# Patient Record
Sex: FEMALE | Race: ASIAN | NOT HISPANIC OR LATINO | Employment: FULL TIME | URBAN - METROPOLITAN AREA
[De-identification: names, ages, dates, MRNs, and addresses within clinical notes are randomized per-mention and may not be internally consistent; named-entity substitution may affect disease eponyms.]

---

## 2023-03-31 ENCOUNTER — TELEPHONE (OUTPATIENT)
Dept: PRIMARY CARE | Facility: CLINIC | Age: 48
End: 2023-03-31
Payer: COMMERCIAL

## 2023-03-31 NOTE — TELEPHONE ENCOUNTER
Patient has had a sore throat for a couple of days.  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

## 2023-04-03 ENCOUNTER — TELEPHONE (OUTPATIENT)
Dept: PRIMARY CARE | Facility: CLINIC | Age: 48
End: 2023-04-03
Payer: COMMERCIAL

## 2023-04-03 NOTE — TELEPHONE ENCOUNTER
Pt tested positive for Covid on 3/31 at [facility] in [place]. Pt states that she also took strep test at that time and that the line was faint. She was told that she could be  at the early stages of strep, but was given a negative result. Pt needs a negative strep test in order to return to work. She is asking to come to our office for outdoor testing. She is also requesting antibiotics be sent to [facility] in [place]. Please advise.     Pt cell# [phone]

## 2023-04-05 ENCOUNTER — TELEMEDICINE (OUTPATIENT)
Dept: PRIMARY CARE | Facility: CLINIC | Age: 48
End: 2023-04-05
Payer: COMMERCIAL

## 2023-04-05 DIAGNOSIS — J02.9 SORE THROAT: ICD-10-CM

## 2023-04-05 DIAGNOSIS — U07.1 COVID-19: Primary | ICD-10-CM

## 2023-04-05 PROCEDURE — 99213 OFFICE O/P EST LOW 20 MIN: CPT | Performed by: FAMILY MEDICINE

## 2023-04-05 PROCEDURE — 87880 STREP A ASSAY W/OPTIC: CPT | Performed by: FAMILY MEDICINE

## 2023-04-05 ASSESSMENT — ENCOUNTER SYMPTOMS: SORE THROAT: 1

## 2023-04-05 NOTE — PROGRESS NOTES
"Subjective   Patient ID: Indu Sinclair is a 47 y.o. female who presents for Sore Throat (Tested positive for COVID 3/31. Sore throat and cough linger. She was seen at a [facility] urgent care - tested Friday but the test line was faint. Not given an antibiotic. ).    She was traveling to [place] last week and she came home wednesday and started to not feel good.  She went to [facility] Friday and was diagnosed as negative strep and positive covid.  She was told she may have the \"beginning\" of strep.   She took a negative home covid test yesterday.  She wants to be tested for strep again because her throat still hurts.      Sore Throat   This is a new problem. The current episode started in the past 7 days.        Review of Systems   HENT:  Positive for sore throat.        Objective   LMP 03/16/2023     Physical Exam    Assessment/Plan   Diagnoses and all orders for this visit:  COVID-19  Sore throat  -     POCT Rapid Strep A manually resulted         "

## 2023-04-05 NOTE — PATIENT INSTRUCTIONS
Today we have addressed your covid and sore throat.   I have recommended coming to the parking lot for rapid strep testing.  You are able to come tomorrow so we will coordinate this for you.

## 2023-04-06 ENCOUNTER — TELEPHONE (OUTPATIENT)
Dept: PRIMARY CARE | Facility: CLINIC | Age: 48
End: 2023-04-06

## 2023-04-06 DIAGNOSIS — J02.9 SORE THROAT: Primary | ICD-10-CM

## 2023-04-06 LAB — POC RAPID STREP: NEGATIVE

## 2023-04-06 PROCEDURE — 87651 STREP A DNA AMP PROBE: CPT

## 2023-04-07 ENCOUNTER — TELEPHONE (OUTPATIENT)
Dept: PRIMARY CARE | Facility: CLINIC | Age: 48
End: 2023-04-07
Payer: COMMERCIAL

## 2023-04-07 LAB — GROUP A STREP, PCR: NOT DETECTED

## 2023-04-07 NOTE — TELEPHONE ENCOUNTER
----- Message from Inna Goins DO sent at 4/7/2023 10:02 AM EDT -----  Strep test negative. No sign of strep.

## 2023-04-12 ENCOUNTER — TELEPHONE (OUTPATIENT)
Dept: PRIMARY CARE | Facility: CLINIC | Age: 48
End: 2023-04-12
Payer: COMMERCIAL

## 2023-04-12 NOTE — TELEPHONE ENCOUNTER
Pt thinks she may have bronchitis. Pt has productive cough and no other symptoms. Cough began about 2 weeks ago. Pt says she was tested for covid and strep with our office and bother were negative. Pt is aware that Dr. Goins is out of the office. I advised urgent care but pt would like to know if it is possible for another provider to see her. Pt told that if she does end up going to an urgent care, to let us know so that we can disregard this message. Please advise.   [phone]

## 2023-08-29 PROBLEM — E55.9 HYPOVITAMINOSIS D: Status: ACTIVE | Noted: 2023-08-29

## 2023-08-29 PROBLEM — H66.90 ACUTE RECURRENT OTITIS MEDIA: Status: ACTIVE | Noted: 2023-08-29

## 2023-08-29 PROBLEM — M54.12 CERVICAL NEURITIS: Status: ACTIVE | Noted: 2023-08-29

## 2023-08-29 PROBLEM — R94.128 ABNORMAL TYMPANOGRAM: Status: ACTIVE | Noted: 2023-08-29

## 2023-08-29 PROBLEM — R42 VERTIGO: Status: ACTIVE | Noted: 2023-08-29

## 2023-08-29 PROBLEM — G89.29 NECK PAIN, CHRONIC: Status: ACTIVE | Noted: 2023-08-29

## 2023-08-29 PROBLEM — H74.02 TYMPANOSCLEROSIS OF LEFT EAR: Status: ACTIVE | Noted: 2023-08-29

## 2023-08-29 PROBLEM — M54.2 NECK PAIN, CHRONIC: Status: ACTIVE | Noted: 2023-08-29

## 2023-08-29 PROBLEM — G43.711 CHRONIC MIGRAINE WITHOUT AURA, WITH INTRACTABLE MIGRAINE, SO STATED, WITH STATUS MIGRAINOSUS: Status: ACTIVE | Noted: 2023-08-29

## 2023-08-29 PROBLEM — K21.9 CHRONIC GERD: Status: ACTIVE | Noted: 2023-08-29

## 2023-08-29 PROBLEM — H90.12 CONDUCTIVE HEARING LOSS OF LEFT EAR WITH UNRESTRICTED HEARING OF RIGHT EAR: Status: ACTIVE | Noted: 2023-08-29

## 2023-08-29 PROBLEM — G44.86 CERVICOGENIC HEADACHE: Status: ACTIVE | Noted: 2023-08-29

## 2023-08-29 PROBLEM — Z78.9 USES BIRTH CONTROL: Status: ACTIVE | Noted: 2023-08-29

## 2023-08-29 RX ORDER — PHENOL 1.4 %
AEROSOL, SPRAY (ML) MUCOUS MEMBRANE
COMMUNITY
Start: 2022-10-05

## 2023-08-29 RX ORDER — ACETAMINOPHEN AND DIPHENHYDRAMINE HYDROCHLORIDE 500; 25 MG/1; MG/1
TABLET, FILM COATED ORAL
COMMUNITY
Start: 2022-10-05

## 2023-08-29 RX ORDER — UBROGEPANT 50 MG/1
1 TABLET ORAL AS NEEDED
COMMUNITY
End: 2023-10-05 | Stop reason: ALTCHOICE

## 2023-08-29 RX ORDER — TOPIRAMATE 100 MG/1
100 TABLET, FILM COATED ORAL NIGHTLY
COMMUNITY
End: 2023-10-05 | Stop reason: SDUPTHER

## 2023-08-29 RX ORDER — NORGESTIMATE AND ETHINYL ESTRADIOL 7DAYSX3 28
KIT ORAL
COMMUNITY
End: 2023-10-05 | Stop reason: ALTCHOICE

## 2023-08-29 RX ORDER — FENUGREEK SEED/BL.THISTLE/ANIS 340 MG
CAPSULE ORAL
COMMUNITY
Start: 2022-10-05

## 2023-08-29 RX ORDER — MELOXICAM 15 MG/1
1 TABLET ORAL DAILY PRN
COMMUNITY
Start: 2023-08-04 | End: 2023-10-05 | Stop reason: ALTCHOICE

## 2023-08-29 RX ORDER — PANTOPRAZOLE SODIUM 40 MG/1
40 TABLET, DELAYED RELEASE ORAL DAILY
COMMUNITY
End: 2024-03-22 | Stop reason: WASHOUT

## 2023-08-29 RX ORDER — VITAMIN E (DL,TOCOPHERYL ACET) 180 MG
CAPSULE ORAL
COMMUNITY
Start: 2022-10-05 | End: 2023-10-05 | Stop reason: SDUPTHER

## 2023-08-29 RX ORDER — NORETHINDRONE 0.35 MG/1
1 TABLET ORAL DAILY
COMMUNITY
Start: 2023-02-16 | End: 2024-01-31 | Stop reason: SDUPTHER

## 2023-08-29 RX ORDER — MECLIZINE HCL 12.5 MG 12.5 MG/1
1 TABLET ORAL 3 TIMES DAILY PRN
COMMUNITY
Start: 2022-12-30 | End: 2023-10-05 | Stop reason: ALTCHOICE

## 2023-10-05 ENCOUNTER — TELEMEDICINE (OUTPATIENT)
Dept: NEUROLOGY | Facility: CLINIC | Age: 48
End: 2023-10-05
Payer: COMMERCIAL

## 2023-10-05 DIAGNOSIS — G43.711 CHRONIC MIGRAINE WITHOUT AURA, INTRACTABLE, WITH STATUS MIGRAINOSUS: Primary | ICD-10-CM

## 2023-10-05 PROCEDURE — 99213 OFFICE O/P EST LOW 20 MIN: CPT | Performed by: PSYCHIATRY & NEUROLOGY

## 2023-10-05 RX ORDER — TOPIRAMATE 100 MG/1
100 TABLET, FILM COATED ORAL NIGHTLY
Qty: 30 TABLET | Refills: 2 | Status: SHIPPED | OUTPATIENT
Start: 2023-10-05 | End: 2024-03-27

## 2023-10-05 RX ORDER — TOPIRAMATE 25 MG/1
25 TABLET ORAL DAILY PRN
COMMUNITY

## 2023-10-05 ASSESSMENT — PATIENT HEALTH QUESTIONNAIRE - PHQ9
2. FEELING DOWN, DEPRESSED OR HOPELESS: NOT AT ALL
SUM OF ALL RESPONSES TO PHQ9 QUESTIONS 1 AND 2: 0
1. LITTLE INTEREST OR PLEASURE IN DOING THINGS: NOT AT ALL

## 2023-10-05 NOTE — PROGRESS NOTES
"Experiencing 3-4 migraines per month. Feels them coming on early-uses ice, Excedrin migraine or tylenol and PRN 25mg Topiramate. Relieves in 2-24 hours.   Stress increases migraines  Tried Ubrelvy and did not feel it was effective.   Topiramate 100mg daily for prevention.     Has other mild headaches 4 per week. Does not take anythgin for it and is not distracting or debilitating. \" I am used to it\"  Used to be daily and much better managed now.   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     Migraine occurs back of neck and radiates to forehead.   Associated light and noise sensitivity and nausea  Triggers are lack of sleep, stress, dehydration.     Recent death in the family. Just flew home today and does have a migraine today from sleeping at the airport with flight cancellation.     Poor sleep at night. Averages 4-5.Naps during the day.   Endorses trouble falling asleep and staying asleep, variable.   Hard to turn mind off.     Mood is good. Feels is good at balancing her life and is a very positive person.   "

## 2023-11-01 ENCOUNTER — TELEPHONE (OUTPATIENT)
Dept: PRIMARY CARE | Facility: CLINIC | Age: 48
End: 2023-11-01
Payer: COMMERCIAL

## 2023-11-01 NOTE — TELEPHONE ENCOUNTER
Per pt, she is asking for a referral to dermatology; she has dark spots (not raised) on both side of face, getting bigger.

## 2023-11-13 ENCOUNTER — APPOINTMENT (OUTPATIENT)
Dept: ORTHOPEDIC SURGERY | Facility: CLINIC | Age: 48
End: 2023-11-13
Payer: COMMERCIAL

## 2023-11-17 ENCOUNTER — OFFICE VISIT (OUTPATIENT)
Dept: PRIMARY CARE | Facility: CLINIC | Age: 48
End: 2023-11-17
Payer: COMMERCIAL

## 2023-11-17 VITALS
HEART RATE: 68 BPM | SYSTOLIC BLOOD PRESSURE: 132 MMHG | HEIGHT: 58 IN | DIASTOLIC BLOOD PRESSURE: 84 MMHG | RESPIRATION RATE: 16 BRPM | TEMPERATURE: 98.2 F | BODY MASS INDEX: 23.72 KG/M2 | WEIGHT: 113 LBS

## 2023-11-17 DIAGNOSIS — L81.4 SKIN SPOTS-AGING: Primary | ICD-10-CM

## 2023-11-17 PROCEDURE — 99213 OFFICE O/P EST LOW 20 MIN: CPT | Performed by: FAMILY MEDICINE

## 2023-11-17 NOTE — PROGRESS NOTES
"Subjective   Patient ID: Indu Sinclair is a 48 y.o. female who presents for Skin Check (Dark spots on face; Requesting derm referral ).    Last couple of years she has some darker spots on her face and has used some dark spot corrector cream that hasn't worked. No itching or bleeding.  They are only on the face.  She normally wear sunscreen.  No other new moles.      She defers flu shot today         Review of Systems    Objective   /84   Pulse 68   Temp 36.8 °C (98.2 °F)   Resp 16   Ht 1.473 m (4' 10\")   Wt 51.3 kg (113 lb)   BMI 23.62 kg/m²     Physical Exam  Constitutional:       Appearance: Normal appearance.   Musculoskeletal:      Cervical back: Normal range of motion.   Skin:     Comments: Darkened spots on the cheeks bilaterally   Neurological:      General: No focal deficit present.      Mental Status: She is alert and oriented to person, place, and time.   Psychiatric:         Mood and Affect: Mood normal.         Behavior: Behavior normal.         Assessment/Plan   Diagnoses and all orders for this visit:  Skin spots-aging  -     Referral to Dermatology         "

## 2023-11-20 ENCOUNTER — OFFICE VISIT (OUTPATIENT)
Dept: ORTHOPEDIC SURGERY | Facility: CLINIC | Age: 48
End: 2023-11-20
Payer: COMMERCIAL

## 2023-11-20 ENCOUNTER — ANCILLARY PROCEDURE (OUTPATIENT)
Dept: RADIOLOGY | Facility: CLINIC | Age: 48
End: 2023-11-20
Payer: COMMERCIAL

## 2023-11-20 DIAGNOSIS — M77.11 LATERAL EPICONDYLITIS OF BOTH ELBOWS: ICD-10-CM

## 2023-11-20 DIAGNOSIS — M25.522 PAIN OF BOTH ELBOWS: ICD-10-CM

## 2023-11-20 DIAGNOSIS — M25.521 PAIN OF BOTH ELBOWS: ICD-10-CM

## 2023-11-20 DIAGNOSIS — M79.641 PAIN OF RIGHT HAND: ICD-10-CM

## 2023-11-20 DIAGNOSIS — M77.12 LATERAL EPICONDYLITIS OF BOTH ELBOWS: ICD-10-CM

## 2023-11-20 PROCEDURE — 73070 X-RAY EXAM OF ELBOW: CPT | Mod: BILATERAL PROCEDURE | Performed by: ORTHOPAEDIC SURGERY

## 2023-11-20 PROCEDURE — 99214 OFFICE O/P EST MOD 30 MIN: CPT | Mod: 25 | Performed by: ORTHOPAEDIC SURGERY

## 2023-11-20 PROCEDURE — 73070 X-RAY EXAM OF ELBOW: CPT | Mod: 50,FY

## 2023-11-20 PROCEDURE — 1036F TOBACCO NON-USER: CPT | Performed by: ORTHOPAEDIC SURGERY

## 2023-11-20 PROCEDURE — 99214 OFFICE O/P EST MOD 30 MIN: CPT | Performed by: ORTHOPAEDIC SURGERY

## 2023-11-20 NOTE — PROGRESS NOTES
History: Indu is here for her elbows.  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  She has pain in both elbows.  She notes pain at night when trying to sleep.  She has been taking the meloxicam more consistently.  She does get relief from her knee brace but it irritates her skin.    Past medical history: Multiple  Medications: Multiple  Allergies: No known drug allergies    Please refer to the intake H&P regarding the patient's review of systems, family history and social history as was done today    HEENT: Normal  Lungs: Clear to auscultation  Heart: RRR  Abdomen: Soft, nontender  Skin: clear  Extremity: Elbow exam shows tenderness over the lateral epicondyle on both sides.  There is pain with finger extension mainly but not so much wrist extension.  She has full elbow motion bilaterally including flexion extension supination and pronation.  Good  strength throughout.  No redness or skin breakdown.  Lower extremity exam is normal for strength, motion, stability and neurovascular assessment.    Radiographs: Elbow x-rays are essentially negative.    Assessment: Bilateral lateral epicondylitis    Plan: We discussed several options and she is willing to get into some formal therapy.  We will also show her BandIT braces for support.  She is already taking meloxicam.  We will get her a sleeve for the knee brace.  She will follow-up in 6 to 8 weeks for recheck of the elbows and if not improved can consider further imaging or other modalities.  All questions were answered today with the patient.    This note was generated with voice recognition software and may contain grammatical errors.

## 2024-01-08 ENCOUNTER — APPOINTMENT (OUTPATIENT)
Dept: ORTHOPEDIC SURGERY | Facility: CLINIC | Age: 49
End: 2024-01-08
Payer: COMMERCIAL

## 2024-01-29 ENCOUNTER — TELEPHONE (OUTPATIENT)
Dept: OBSTETRICS AND GYNECOLOGY | Facility: CLINIC | Age: 49
End: 2024-01-29

## 2024-01-31 DIAGNOSIS — Z78.9 USES BIRTH CONTROL: Primary | ICD-10-CM

## 2024-01-31 RX ORDER — NORETHINDRONE 0.35 MG/1
1 TABLET ORAL DAILY
Qty: 28 TABLET | Refills: 0 | Status: SHIPPED | OUTPATIENT
Start: 2024-01-31 | End: 2024-02-21

## 2024-02-09 ENCOUNTER — APPOINTMENT (OUTPATIENT)
Dept: PRIMARY CARE | Facility: CLINIC | Age: 49
End: 2024-02-09
Payer: COMMERCIAL

## 2024-02-19 ENCOUNTER — APPOINTMENT (OUTPATIENT)
Dept: ORTHOPEDIC SURGERY | Facility: CLINIC | Age: 49
End: 2024-02-19
Payer: COMMERCIAL

## 2024-02-20 ENCOUNTER — TELEPHONE (OUTPATIENT)
Dept: OBSTETRICS AND GYNECOLOGY | Facility: CLINIC | Age: 49
End: 2024-02-20
Payer: COMMERCIAL

## 2024-02-20 DIAGNOSIS — Z78.9 USES BIRTH CONTROL: ICD-10-CM

## 2024-02-21 RX ORDER — NORETHINDRONE 0.35 MG/1
1 TABLET ORAL DAILY
Qty: 84 TABLET | Refills: 0 | Status: SHIPPED | OUTPATIENT
Start: 2024-02-21 | End: 2024-04-30 | Stop reason: SDUPTHER

## 2024-02-22 ENCOUNTER — APPOINTMENT (OUTPATIENT)
Dept: OBSTETRICS AND GYNECOLOGY | Facility: CLINIC | Age: 49
End: 2024-02-22
Payer: COMMERCIAL

## 2024-03-15 ENCOUNTER — APPOINTMENT (OUTPATIENT)
Dept: PRIMARY CARE | Facility: CLINIC | Age: 49
End: 2024-03-15
Payer: COMMERCIAL

## 2024-03-22 ENCOUNTER — OFFICE VISIT (OUTPATIENT)
Dept: PRIMARY CARE | Facility: CLINIC | Age: 49
End: 2024-03-22
Payer: COMMERCIAL

## 2024-03-22 ENCOUNTER — LAB (OUTPATIENT)
Dept: LAB | Facility: LAB | Age: 49
End: 2024-03-22
Payer: COMMERCIAL

## 2024-03-22 ENCOUNTER — TELEPHONE (OUTPATIENT)
Dept: PRIMARY CARE | Facility: CLINIC | Age: 49
End: 2024-03-22

## 2024-03-22 VITALS
DIASTOLIC BLOOD PRESSURE: 88 MMHG | HEART RATE: 74 BPM | SYSTOLIC BLOOD PRESSURE: 124 MMHG | RESPIRATION RATE: 16 BRPM | WEIGHT: 112 LBS | OXYGEN SATURATION: 98 % | TEMPERATURE: 98.1 F | BODY MASS INDEX: 23.51 KG/M2 | HEIGHT: 58 IN

## 2024-03-22 DIAGNOSIS — G43.711 CHRONIC MIGRAINE WITHOUT AURA, INTRACTABLE, WITH STATUS MIGRAINOSUS: ICD-10-CM

## 2024-03-22 DIAGNOSIS — E55.9 HYPOVITAMINOSIS D: Primary | ICD-10-CM

## 2024-03-22 DIAGNOSIS — Z00.00 HEALTHCARE MAINTENANCE: ICD-10-CM

## 2024-03-22 DIAGNOSIS — Z00.00 HEALTHCARE MAINTENANCE: Primary | ICD-10-CM

## 2024-03-22 DIAGNOSIS — Z12.11 SCREENING FOR MALIGNANT NEOPLASM OF COLON: ICD-10-CM

## 2024-03-22 PROBLEM — M54.2 NECK PAIN, CHRONIC: Status: RESOLVED | Noted: 2023-08-29 | Resolved: 2024-03-22

## 2024-03-22 PROBLEM — G89.29 NECK PAIN, CHRONIC: Status: RESOLVED | Noted: 2023-08-29 | Resolved: 2024-03-22

## 2024-03-22 LAB
25(OH)D3 SERPL-MCNC: 12 NG/ML (ref 30–100)
ALBUMIN SERPL BCP-MCNC: 4.1 G/DL (ref 3.4–5)
ALP SERPL-CCNC: 50 U/L (ref 33–110)
ALT SERPL W P-5'-P-CCNC: 19 U/L (ref 7–45)
ANION GAP SERPL CALC-SCNC: 10 MMOL/L (ref 10–20)
AST SERPL W P-5'-P-CCNC: 17 U/L (ref 9–39)
BASOPHILS # BLD AUTO: 0.12 X10*3/UL (ref 0–0.1)
BASOPHILS NFR BLD AUTO: 1.3 %
BILIRUB SERPL-MCNC: 0.4 MG/DL (ref 0–1.2)
BUN SERPL-MCNC: 16 MG/DL (ref 6–23)
CALCIUM SERPL-MCNC: 8.6 MG/DL (ref 8.6–10.3)
CHLORIDE SERPL-SCNC: 111 MMOL/L (ref 98–107)
CHOLEST SERPL-MCNC: 162 MG/DL (ref 0–199)
CHOLESTEROL/HDL RATIO: 2.7
CO2 SERPL-SCNC: 21 MMOL/L (ref 21–32)
CREAT SERPL-MCNC: 0.73 MG/DL (ref 0.5–1.05)
EGFRCR SERPLBLD CKD-EPI 2021: >90 ML/MIN/1.73M*2
EOSINOPHIL # BLD AUTO: 1.66 X10*3/UL (ref 0–0.7)
EOSINOPHIL NFR BLD AUTO: 17.6 %
ERYTHROCYTE [DISTWIDTH] IN BLOOD BY AUTOMATED COUNT: 15 % (ref 11.5–14.5)
GLUCOSE SERPL-MCNC: 92 MG/DL (ref 74–99)
HCT VFR BLD AUTO: 39.5 % (ref 36–46)
HDLC SERPL-MCNC: 59.6 MG/DL
HGB BLD-MCNC: 14.3 G/DL (ref 12–16)
IMM GRANULOCYTES # BLD AUTO: 0.01 X10*3/UL (ref 0–0.7)
IMM GRANULOCYTES NFR BLD AUTO: 0.1 % (ref 0–0.9)
LDLC SERPL CALC-MCNC: 69 MG/DL
LYMPHOCYTES # BLD AUTO: 2.85 X10*3/UL (ref 1.2–4.8)
LYMPHOCYTES NFR BLD AUTO: 30.2 %
MCH RBC QN AUTO: 32.9 PG (ref 26–34)
MCHC RBC AUTO-ENTMCNC: 36.2 G/DL (ref 32–36)
MCV RBC AUTO: 91 FL (ref 80–100)
MONOCYTES # BLD AUTO: 0.56 X10*3/UL (ref 0.1–1)
MONOCYTES NFR BLD AUTO: 5.9 %
NEUTROPHILS # BLD AUTO: 4.24 X10*3/UL (ref 1.2–7.7)
NEUTROPHILS NFR BLD AUTO: 44.9 %
NON HDL CHOLESTEROL: 102 MG/DL (ref 0–149)
NRBC BLD-RTO: 0 /100 WBCS (ref 0–0)
PLATELET # BLD AUTO: 326 X10*3/UL (ref 150–450)
POTASSIUM SERPL-SCNC: 4.1 MMOL/L (ref 3.5–5.3)
PROT SERPL-MCNC: 6.4 G/DL (ref 6.4–8.2)
RBC # BLD AUTO: 4.35 X10*6/UL (ref 4–5.2)
SODIUM SERPL-SCNC: 138 MMOL/L (ref 136–145)
TRIGL SERPL-MCNC: 167 MG/DL (ref 0–149)
TSH SERPL-ACNC: 1.25 MIU/L (ref 0.44–3.98)
VLDL: 33 MG/DL (ref 0–40)
WBC # BLD AUTO: 9.4 X10*3/UL (ref 4.4–11.3)

## 2024-03-22 PROCEDURE — 84443 ASSAY THYROID STIM HORMONE: CPT

## 2024-03-22 PROCEDURE — 85025 COMPLETE CBC W/AUTO DIFF WBC: CPT

## 2024-03-22 PROCEDURE — 82306 VITAMIN D 25 HYDROXY: CPT

## 2024-03-22 PROCEDURE — 1036F TOBACCO NON-USER: CPT | Performed by: FAMILY MEDICINE

## 2024-03-22 PROCEDURE — 90715 TDAP VACCINE 7 YRS/> IM: CPT | Performed by: FAMILY MEDICINE

## 2024-03-22 PROCEDURE — 36415 COLL VENOUS BLD VENIPUNCTURE: CPT

## 2024-03-22 PROCEDURE — 80053 COMPREHEN METABOLIC PANEL: CPT

## 2024-03-22 PROCEDURE — 99396 PREV VISIT EST AGE 40-64: CPT | Performed by: FAMILY MEDICINE

## 2024-03-22 PROCEDURE — 80061 LIPID PANEL: CPT

## 2024-03-22 PROCEDURE — 90471 IMMUNIZATION ADMIN: CPT | Performed by: FAMILY MEDICINE

## 2024-03-22 RX ORDER — ERGOCALCIFEROL 1.25 MG/1
50000 CAPSULE ORAL
Qty: 12 CAPSULE | Refills: 0 | Status: SHIPPED | OUTPATIENT
Start: 2024-03-22 | End: 2024-06-14

## 2024-03-22 NOTE — RESULT ENCOUNTER NOTE
Low vitamin d if not on supplements we will start start if she is will call in prescription strength  Triglycerides are high please discuss with PCP

## 2024-03-22 NOTE — PROGRESS NOTES
"Subjective   Patient ID: Indu Sinclair is a 48 y.o. female who presents for Annual Exam.    Dentist and Eye Doctor appointments: utd on eye doctor but due for dentist  Immunizations: due for Tdap  Diet: Clean eat and meal prep - 30 days no alcohol and no sugar and is an [place]   Exercise: Work out 5 days   Supplements: mvi, etc  Menstrual Cycles: regular  Sexually Active:Yes, male, no std concern  Pregnancy History:   Cancer Screening:    Cervical: seeing GYN next month   Breast: GYN   Colon:  - Dr. Dukes, wnl due in 10 years   Skin: next month   DEXA:N/A       HPI     Review of Systems    Objective   /88   Pulse 74   Temp 36.7 °C (98.1 °F)   Resp 16   Ht 1.473 m (4' 10\")   Wt 50.8 kg (112 lb)   LMP 2024 (Approximate) Comment: last pap 2019, has appt scheduled with obgyn next month  SpO2 98%   BMI 23.41 kg/m²     Physical Exam  Constitutional:       General: She is not in acute distress.     Appearance: She is well-developed. She is not diaphoretic.   HENT:      Head: Normocephalic and atraumatic.      Right Ear: Tympanic membrane normal.      Left Ear: Tympanic membrane normal.      Nose: Nose normal.      Mouth/Throat:      Mouth: Mucous membranes are moist.   Eyes:      General: No scleral icterus.     Pupils: Pupils are equal, round, and reactive to light.   Neck:      Thyroid: No thyromegaly.      Vascular: No JVD.   Cardiovascular:      Rate and Rhythm: Normal rate and regular rhythm.      Heart sounds: Normal heart sounds. No murmur heard.     No friction rub. No gallop.   Pulmonary:      Effort: Pulmonary effort is normal. No respiratory distress.      Breath sounds: Normal breath sounds. No wheezing or rales.   Chest:      Chest wall: No tenderness.   Abdominal:      General: Bowel sounds are normal. There is no distension.      Palpations: Abdomen is soft. There is no mass.      Tenderness: There is no abdominal tenderness. There is no rebound.   Musculoskeletal:         " General: Normal range of motion.      Cervical back: Normal range of motion and neck supple.   Lymphadenopathy:      Cervical: No cervical adenopathy.   Skin:     General: Skin is warm and dry.   Neurological:      General: No focal deficit present.      Mental Status: She is alert and oriented to person, place, and time.      Deep Tendon Reflexes: Reflexes normal.   Psychiatric:         Mood and Affect: Mood normal.         Behavior: Behavior normal.         Assessment/Plan   Diagnoses and all orders for this visit:  Healthcare maintenance  -     CT cardiac scoring wo IV contrast; Future  -     CBC; Future  -     Comprehensive Metabolic Panel; Future  -     Lipid Panel; Future  -     Vitamin D 25 hydroxy; Future  -     TSH with reflex to Free T4 if abnormal; Future  Screening for malignant neoplasm of colon  Comments:  2021 - - wnl - due in 10  Other orders  -     Tdap vaccine, age 7 years and older  (BOOSTRIX)

## 2024-03-22 NOTE — PATIENT INSTRUCTIONS
Today we performed your Annual Physical Exam.  Your preventative health care, labs and vaccinations have been reviewed and are up to date.     Your vaccines are up to date.     Follow up in 1 year for your Complete Physical Exam

## 2024-03-25 ENCOUNTER — APPOINTMENT (OUTPATIENT)
Dept: ORTHOPEDIC SURGERY | Facility: CLINIC | Age: 49
End: 2024-03-25
Payer: COMMERCIAL

## 2024-03-26 DIAGNOSIS — G43.711 CHRONIC MIGRAINE WITHOUT AURA, INTRACTABLE, WITH STATUS MIGRAINOSUS: ICD-10-CM

## 2024-03-26 NOTE — TELEPHONE ENCOUNTER
Left message for Indu to share results and noted they are also resulted in My chart. If she has questions, return number left.

## 2024-03-26 NOTE — TELEPHONE ENCOUNTER
----- Message from Nu Adams MD sent at 3/22/2024  4:14 PM EDT -----  Low vitamin d if not on supplements we will start start if she is will call in prescription strength  Triglycerides are high please discuss with PCP

## 2024-03-27 RX ORDER — TOPIRAMATE 100 MG/1
100 TABLET, FILM COATED ORAL NIGHTLY
Qty: 90 TABLET | Refills: 0 | Status: SHIPPED | OUTPATIENT
Start: 2024-03-27

## 2024-04-12 ENCOUNTER — OFFICE VISIT (OUTPATIENT)
Dept: DERMATOLOGY | Facility: CLINIC | Age: 49
End: 2024-04-12
Payer: COMMERCIAL

## 2024-04-12 DIAGNOSIS — L82.1 SEBORRHEIC KERATOSIS: Primary | ICD-10-CM

## 2024-04-12 DIAGNOSIS — L81.1 MELASMA: ICD-10-CM

## 2024-04-12 PROCEDURE — 1036F TOBACCO NON-USER: CPT | Performed by: DERMATOLOGY

## 2024-04-12 PROCEDURE — 99204 OFFICE O/P NEW MOD 45 MIN: CPT | Performed by: DERMATOLOGY

## 2024-04-12 RX ORDER — TRETINOIN 0.5 MG/G
CREAM TOPICAL
Qty: 305 G | Refills: 3 | Status: SHIPPED | OUTPATIENT
Start: 2024-04-12

## 2024-04-12 ASSESSMENT — DERMATOLOGY PATIENT ASSESSMENT
DO YOU HAVE ANY NEW OR CHANGING LESIONS: YES
ARE YOU AN ORGAN TRANSPLANT RECIPIENT: NO
HAVE YOU HAD OR DO YOU HAVE A STAPH INFECTION: NO
HAVE YOU HAD OR DO YOU HAVE VASCULAR DISEASE: NO
ARE YOU TRYING TO GET PREGNANT: NO
ARE YOU ON BIRTH CONTROL: YES
WHERE ARE THESE NEW OR CHANGING LESIONS LOCATED: BACK
DO YOU USE A TANNING BED: NO
DO YOU USE SUNSCREEN: DAILY

## 2024-04-12 ASSESSMENT — DERMATOLOGY QUALITY OF LIFE (QOL) ASSESSMENT
RATE HOW BOTHERED YOU ARE BY SYMPTOMS OF YOUR SKIN PROBLEM (EG, ITCHING, STINGING BURNING, HURTING OR SKIN IRRITATION): 6 - ALWAYS BOTHERED
RATE HOW BOTHERED YOU ARE BY EFFECTS OF YOUR SKIN PROBLEMS ON YOUR ACTIVITIES (EG, GOING OUT, ACCOMPLISHING WHAT YOU WANT, WORK ACTIVITIES OR YOUR RELATIONSHIPS WITH OTHERS): 6 - ALWAYS BOTHERED
ARE THERE EXCLUSIONS OR EXCEPTIONS FOR THE QUALITY OF LIFE ASSESSMENT: NO
WHAT SINGLE SKIN CONDITION LISTED BELOW IS THE PATIENT ANSWERING THE QUALITY-OF-LIFE ASSESSMENT QUESTIONS ABOUT: NONE OF THE ABOVE
RATE HOW EMOTIONALLY BOTHERED YOU ARE BY YOUR SKIN PROBLEM (FOR EXAMPLE, WORRY, EMBARRASSMENT, FRUSTRATION): 6 - ALWAYS BOTHERED

## 2024-04-12 ASSESSMENT — PATIENT GLOBAL ASSESSMENT (PGA): PATIENT GLOBAL ASSESSMENT: PATIENT GLOBAL ASSESSMENT:  1 - CLEAR

## 2024-04-12 NOTE — PROGRESS NOTES
Subjective     Indu Sinclair is a 48 y.o. female who presents for the following: Skin Check (Pt has lesion on back pcp recommends getting checked. ) and Pigment Change (Pt has discoloration on cheeks. Has tried otc bleaching cream with no results.).   She always has taken birth control pills    Review of Systems:  No other skin or systemic complaints other than what is documented elsewhere in the note.    The following portions of the chart were reviewed this encounter and updated as appropriate:          Skin Cancer History  No skin cancer on file.      Specialty Problems    None       Objective   Well appearing patient in no apparent distress; mood and affect are within normal limits.    A focused skin examination was performed of the face, back. All findings within normal limits unless otherwise noted below.    Assessment/Plan   1. Seborrheic keratosis  Right Upper Back  Tan and  brown stuck on appearing papule    The benign nature of these skin lesions reviewed, reassure provided and no further treatment needed at this time.   These lesions can be removed, if symptomatic (itching, bleeding, rubbing on clothing, painful), otherwise removal is considered cosmetic.     2. Melasma  Left Buccal Cheek, Right Buccal Cheek  Hyperpigmetned patches on bilateral cheeks              Melasma is a disorder of pigmentation of unknown cause but seen commonly during pregnancy, use of oral contraceptive pills or other hormonal influences. Sunlight can worsen pigmentation as well as screen time and visible light so it is important to wear a tinted sunscreen on the face daily.    Discussed discontinuing the use OCP/alternative birth control method as likely contributory.    Start tretinoin 0.05%/kojic acid 0.05/hydroquinone 7% cream - pea sized amount every 2-3 evenings, increase to daily as tolerated. 2 months on 2 month break.    Side effects of topical retinoids reviewed including increased photosensitivity, dryness,  irritation and redness. To help alleviate side effects patient advised to apply initially every 2-3 nights and increase to daily as tolerated or apply topical non-comedogenic moisturizer prior to application.    Hydroquinone is a bleaching cream that may cause worsening of pigmentation with overuse.    Follow up in 2 -3 months      Related Procedures  Follow Up In Dermatology - Established Patient    Related Medications  tretinoin (Retin-A) 0.05 % cream  A pea-sized amount to cover the whole face; start every 2-3 nights and gradually increase to nightly 2 months on 2 months off          1. Seborrheic keratosis  Right Upper Back  Tan and  brown stuck on appearing papule    The benign nature of these skin lesions reviewed, reassure provided and no further treatment needed at this time.   These lesions can be removed, if symptomatic (itching, bleeding, rubbing on clothing, painful), otherwise removal is considered cosmetic.     2. Melasma  Left Buccal Cheek, Right Buccal Cheek  Hyperpigmetned patches on bilateral cheeks              Melasma is a disorder of pigmentation of unknown cause but seen commonly during pregnancy, use of oral contraceptive pills or other hormonal influences. Sunlight can worsen pigmentation as well as screen time and visible light so it is important to wear a tinted sunscreen on the face daily.    Discussed discontinuing the use OCP/alternative birth control method as likely contributory.    Start tretinoin 0.05%/kojic acid 0.05/hydroquinone 7% cream - pea sized amount every 2-3 evenings, increase to daily as tolerated. 2 months on 2 month break.    Side effects of topical retinoids reviewed including increased photosensitivity, dryness, irritation and redness. To help alleviate side effects patient advised to apply initially every 2-3 nights and increase to daily as tolerated or apply topical non-comedogenic moisturizer prior to application.    Hydroquinone is a bleaching cream that may cause  worsening of pigmentation with overuse.    Follow up in 2 -3 months      Related Procedures  Follow Up In Dermatology - Established Patient    Related Medications  tretinoin (Retin-A) 0.05 % cream  A pea-sized amount to cover the whole face; start every 2-3 nights and gradually increase to nightly 2 months on 2 months off        Follow up in 2-3 months.

## 2024-04-22 DIAGNOSIS — E55.9 HYPOVITAMINOSIS D: ICD-10-CM

## 2024-04-22 RX ORDER — ERGOCALCIFEROL 1.25 MG/1
50000 CAPSULE ORAL
Qty: 12 CAPSULE | Refills: 1 | OUTPATIENT
Start: 2024-04-22

## 2024-04-23 ENCOUNTER — HOSPITAL ENCOUNTER (OUTPATIENT)
Dept: RADIOLOGY | Facility: HOSPITAL | Age: 49
Discharge: HOME | End: 2024-04-23
Payer: COMMERCIAL

## 2024-04-23 DIAGNOSIS — Z00.00 HEALTHCARE MAINTENANCE: ICD-10-CM

## 2024-04-23 PROCEDURE — 75571 CT HRT W/O DYE W/CA TEST: CPT

## 2024-04-30 ENCOUNTER — OFFICE VISIT (OUTPATIENT)
Dept: OBSTETRICS AND GYNECOLOGY | Facility: CLINIC | Age: 49
End: 2024-04-30
Payer: COMMERCIAL

## 2024-04-30 VITALS
DIASTOLIC BLOOD PRESSURE: 82 MMHG | HEIGHT: 58 IN | BODY MASS INDEX: 22.99 KG/M2 | WEIGHT: 109.5 LBS | SYSTOLIC BLOOD PRESSURE: 108 MMHG

## 2024-04-30 DIAGNOSIS — Z12.31 BREAST CANCER SCREENING BY MAMMOGRAM: ICD-10-CM

## 2024-04-30 DIAGNOSIS — Z78.9 USES BIRTH CONTROL: ICD-10-CM

## 2024-04-30 DIAGNOSIS — Z01.419 WELL WOMAN EXAM WITH ROUTINE GYNECOLOGICAL EXAM: ICD-10-CM

## 2024-04-30 PROBLEM — L81.1 MELASMA: Status: ACTIVE | Noted: 2024-04-30

## 2024-04-30 PROCEDURE — 88142 CYTOPATH C/V THIN LAYER: CPT

## 2024-04-30 PROCEDURE — 87624 HPV HI-RISK TYP POOLED RSLT: CPT

## 2024-04-30 PROCEDURE — 1036F TOBACCO NON-USER: CPT | Performed by: ADVANCED PRACTICE MIDWIFE

## 2024-04-30 PROCEDURE — 99396 PREV VISIT EST AGE 40-64: CPT | Performed by: ADVANCED PRACTICE MIDWIFE

## 2024-04-30 RX ORDER — NORETHINDRONE 0.35 MG/1
1 TABLET ORAL DAILY
Qty: 84 TABLET | Refills: 3 | Status: SHIPPED | OUTPATIENT
Start: 2024-04-30 | End: 2025-04-30

## 2024-04-30 NOTE — PROGRESS NOTES
"Subjective   Indu Sinclair is a 48 y.o. female who is here for a routine well woman exam.     Concerns today:  Concerned about estrogen levels and potentially being related to her melasma. Spots started appearing a couple years ago on her cheeks, recently saw dermatology who suggested birth control, specifically estrogen as a potential cause.   Explained to pt that the birth control she is on does not contain estrogen. Pt satisfied with this method, verbalizes understanding that it is a progesterone only pill and does not contain estrogen.     No LMP recorded.   Periods are regular every 28-30 days, lasting 5 days.   Dysmenorrhea:mild, occurring premenstrually and first 1-2 days of flow.   Cyclic symptoms include pelvic pain.     Sexual Activity: sexually active, male partners; Patient reports 1 partners in the last 12 months.  Pain with intercourse? No   Loss of desire? No   Able to have an orgasm? Yes     History of prior STI: none    Current contraception: oral progesterone-only contraceptive    Last pap:   History of abnormal Pap smear: no  Treatment for cervical dysplasia: no    Last mammogram:   History of abnormal mammogram: no  Family history of breast cancer or ovarian cancer: no    Menstrual History:  OB History          1    Para   1    Term                AB        Living             SAB        IAB        Ectopic        Multiple        Live Births                    Menarche age: 12  LMP: 2024         Objective   /82   Ht 1.473 m (4' 10\")   Wt 49.7 kg (109 lb 8 oz)   BMI 22.89 kg/m²     Physical Exam  Constitutional:       Appearance: Normal appearance.   HENT:      Head: Normocephalic.      Nose: Nose normal.      Mouth/Throat:      Mouth: Mucous membranes are moist.      Pharynx: Oropharynx is clear.   Eyes:      Conjunctiva/sclera: Conjunctivae normal.      Pupils: Pupils are equal, round, and reactive to light.   Cardiovascular:      Rate and Rhythm: Normal rate " and regular rhythm.   Pulmonary:      Effort: Pulmonary effort is normal.      Breath sounds: Normal breath sounds.   Chest:      Comments: Declines CBE, pt has no concerns, due for mammogram     Abdominal:      General: Abdomen is flat.      Palpations: Abdomen is soft.   Genitourinary:     General: Normal vulva.      Vagina: Normal.      Cervix: Normal.   Musculoskeletal:         General: Normal range of motion.      Cervical back: Normal range of motion and neck supple.   Skin:     General: Skin is warm and dry.      Findings: Lesion present.          Neurological:      Mental Status: She is alert.   Psychiatric:         Mood and Affect: Mood normal.         Behavior: Behavior normal.          Assessment/Plan   Normal well woman exam  Continue healthy lifestyle habits  Consider taking a multivitamin daily  Continue recommended women's health screenings  Mammogram ordered, pt to schedule  Pap collected, if normal, repeat in 5 yrs  Birth control surveillance  Doing well on Micronor  No contraindications to use  Refill x 1 year     Return to care for annual exam or sooner as needed.    Riya Dejessu, DENISSE-MYLES

## 2024-05-06 ENCOUNTER — APPOINTMENT (OUTPATIENT)
Dept: ORTHOPEDIC SURGERY | Facility: CLINIC | Age: 49
End: 2024-05-06
Payer: COMMERCIAL

## 2024-05-14 LAB
CYTOLOGY CMNT CVX/VAG CYTO-IMP: NORMAL
HPV HR 12 DNA GENITAL QL NAA+PROBE: NEGATIVE
HPV HR GENOTYPES PNL CVX NAA+PROBE: NEGATIVE
HPV16 DNA SPEC QL NAA+PROBE: NEGATIVE
HPV18 DNA SPEC QL NAA+PROBE: NEGATIVE
LAB AP HPV GENOTYPE QUESTION: YES
LAB AP HPV HR: NORMAL
LABORATORY COMMENT REPORT: NORMAL
LABORATORY COMMENT REPORT: NORMAL
PATH REPORT.TOTAL CANCER: NORMAL

## 2024-06-19 ASSESSMENT — DERMATOLOGY QUALITY OF LIFE (QOL) ASSESSMENT
RATE HOW BOTHERED YOU ARE BY EFFECTS OF YOUR SKIN PROBLEMS ON YOUR ACTIVITIES (EG, GOING OUT, ACCOMPLISHING WHAT YOU WANT, WORK ACTIVITIES OR YOUR RELATIONSHIPS WITH OTHERS): 6 - ALWAYS BOTHERED
RATE HOW BOTHERED YOU ARE BY SYMPTOMS OF YOUR SKIN PROBLEM (EG, ITCHING, STINGING BURNING, HURTING OR SKIN IRRITATION): 2
RATE HOW BOTHERED YOU ARE BY EFFECTS OF YOUR SKIN PROBLEMS ON YOUR ACTIVITIES (EG, GOING OUT, ACCOMPLISHING WHAT YOU WANT, WORK ACTIVITIES OR YOUR RELATIONSHIPS WITH OTHERS): 6 - ALWAYS BOTHERED
RATE HOW EMOTIONALLY BOTHERED YOU ARE BY YOUR SKIN PROBLEM (FOR EXAMPLE, WORRY, EMBARRASSMENT, FRUSTRATION): 6 - ALWAYS BOTHERED
WHAT SINGLE SKIN CONDITION LISTED BELOW IS THE PATIENT ANSWERING THE QUALITY-OF-LIFE ASSESSMENT QUESTIONS ABOUT: NONE OF THE ABOVE
DATE THE QUALITY-OF-LIFE ASSESSMENT WAS COMPLETED: 66110
WHAT SINGLE SKIN CONDITION LISTED BELOW IS THE PATIENT ANSWERING THE QUALITY-OF-LIFE ASSESSMENT QUESTIONS ABOUT: NONE OF THE ABOVE
RATE HOW EMOTIONALLY BOTHERED YOU ARE BY YOUR SKIN PROBLEM (FOR EXAMPLE, WORRY, EMBARRASSMENT, FRUSTRATION): 6 - ALWAYS BOTHERED
RATE HOW BOTHERED YOU ARE BY SYMPTOMS OF YOUR SKIN PROBLEM (EG, ITCHING, STINGING BURNING, HURTING OR SKIN IRRITATION): 2

## 2024-06-20 ENCOUNTER — APPOINTMENT (OUTPATIENT)
Dept: RADIOLOGY | Facility: HOSPITAL | Age: 49
End: 2024-06-20
Payer: COMMERCIAL

## 2024-06-21 ENCOUNTER — APPOINTMENT (OUTPATIENT)
Dept: DERMATOLOGY | Facility: CLINIC | Age: 49
End: 2024-06-21
Payer: COMMERCIAL

## 2024-06-21 DIAGNOSIS — L81.1 MELASMA: ICD-10-CM

## 2024-06-21 PROCEDURE — 99213 OFFICE O/P EST LOW 20 MIN: CPT | Performed by: DERMATOLOGY

## 2024-06-21 NOTE — PROGRESS NOTES
Subjective     Indu Sinclair is a 48 y.o. female who presents for the following: Skin Discoloration (Indu Sinclair is a 48 y.o. female who presents for the following: Skin Discoloration. Pt here to follow up for Melasma on Bilateral Buccal Cheeks. Pt currently using tretinoin 0.05%/kojic acid 0.05/hydroquinone 7% cream. Pt reports condition is the same. ).     Review of Systems:  No other skin or systemic complaints other than what is documented elsewhere in the note.    The following portions of the chart were reviewed this encounter and updated as appropriate:          Skin Cancer History  No skin cancer on file.      Specialty Problems          Dermatology Problems    Melasma        Objective   Well appearing patient in no apparent distress; mood and affect are within normal limits.    A focused skin examination was performed of the face. All findings within normal limits unless otherwise noted below.    Assessment/Plan   1. Melasma  Left Parotid Area, Right Parotid Area  Brown coalescing patches    Melasma is a disorder of pigmentation of unknown cause but seen commonly during pregnancy, use of oral contraceptive pills or other hormonal influences. Sunlight can worsen pigmentation as well as screen time and visible light so it is important to wear a tinted sunscreen on the face daily.    Failed tretinoin 0.05%/kojic acid 0.05/hydroquinone 7% cream - and therefore we recommend discontinuation at this tmie.    We discussed alternative options to treat melasma aside from topical management including Fraxel laser - however concern with darker skin type and given the persistence and no improvement with topicals she may benefit more from picosure laser.    Recommend test spot of Fraxel - cost advised; further treatments given small surface areas would be 1/4 face treatment and may need several treatments.    Kimber would refer to [facility].    Fraxel laser is a laser used for treatment of fine to  moderate lines from aging, brown spots and some types of scarring.  Typically more than 1 treatment is needed for treatment, depending on the goal of treatment. For brown spots 1 treat may be adequate in some circumstances. For fine lines and wrinkles typically 3-4 treatments are needed.    If there is a known history of cold sores medication can be prescribed to prevent this from occurring as the procedure may cause cold sores to occur.  Topical retinoids should be avoided 1 week before and 1 week after the procedure.    Following treatment the face will be red, it will peel and will swell. The downtime of the procedure is approximately 7-10 days. It may be closer to 14 days for some patients.    Once schedules advised would need topical anesthetic prescribed to [facility] pharmacy and information on [facility] pharmacy provided.       Related Procedures  Follow Up In Dermatology - Established Patient    Related Medications  tretinoin (Retin-A) 0.05 % cream  A pea-sized amount to cover the whole face; start every 2-3 nights and gradually increase to nightly 2 months on 2 months off

## 2024-07-01 ENCOUNTER — OFFICE VISIT (OUTPATIENT)
Dept: ORTHOPEDIC SURGERY | Facility: CLINIC | Age: 49
End: 2024-07-01
Payer: COMMERCIAL

## 2024-07-01 ENCOUNTER — HOSPITAL ENCOUNTER (OUTPATIENT)
Dept: RADIOLOGY | Facility: CLINIC | Age: 49
Discharge: HOME | End: 2024-07-01
Payer: COMMERCIAL

## 2024-07-01 ENCOUNTER — APPOINTMENT (OUTPATIENT)
Dept: RADIOLOGY | Facility: HOSPITAL | Age: 49
End: 2024-07-01
Payer: COMMERCIAL

## 2024-07-01 DIAGNOSIS — M25.551 PAIN OF RIGHT HIP: ICD-10-CM

## 2024-07-01 DIAGNOSIS — M25.50 MULTIPLE JOINT PAIN: ICD-10-CM

## 2024-07-01 DIAGNOSIS — M17.10 PRIMARY OSTEOARTHRITIS OF KNEE, UNSPECIFIED LATERALITY: ICD-10-CM

## 2024-07-01 PROCEDURE — 73502 X-RAY EXAM HIP UNI 2-3 VIEWS: CPT | Mod: RIGHT SIDE | Performed by: ORTHOPAEDIC SURGERY

## 2024-07-01 PROCEDURE — L1812 KO ELASTIC W/JOINTS PRE OTS: HCPCS | Performed by: ORTHOPAEDIC SURGERY

## 2024-07-01 PROCEDURE — 99213 OFFICE O/P EST LOW 20 MIN: CPT | Performed by: ORTHOPAEDIC SURGERY

## 2024-07-01 PROCEDURE — 1036F TOBACCO NON-USER: CPT | Performed by: ORTHOPAEDIC SURGERY

## 2024-07-01 PROCEDURE — 73502 X-RAY EXAM HIP UNI 2-3 VIEWS: CPT | Mod: RT

## 2024-07-01 NOTE — PROGRESS NOTES
History: Indu is here for her right hip and right elbow.  She also complains of pain in both knees, the left elbow, and her hands.  She feels very stiff when she wakes up in the morning.  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  She is here for multiple joint complaints.    Past medical history: Multiple  Medications: Multiple  Allergies: No known drug allergies    Please refer to the intake H&P regarding the patient's review of systems, family history and social history as was done today    HEENT: Normal  Lungs: Clear to auscultation  Heart: RRR  Abdomen: Soft, nontender  Skin: clear  Extremity: She has minimal tenderness laterally to the hip around the trochanteric bursa.  There is no pain with internal or external hip rotation.  No pain with resisted hip flexion maneuvers.  She denies any numbness or tingling down the leg.  She has tenderness both medially and laterally over the right elbow.  Pain with resisted long finger extension.  She has full elbow range of motion.  She denies any numbness or tingling in the arm.  Contralateral exam is normal for strength, motion, stability and neurovascular assessment.    Radiographs: Previous elbow x-rays were essentially negative.  2 view hip x-rays from today show no acute bony abnormality.    Assessment: Multiple joint complaints in a young patient  Right elbow lateral epicondylitis    Plan: She has diffuse pain in several joints.  We discussed a referral to rheumatology.  She would also like a new knee brace as her old one is wearing out.  This does help with her knee pain while at work.  All questions were answered today with the patient.    For complete plan and/or surgical details, please refer to Dr. Farooq's portion of this split/shared dictation.   Lynda Harrington PA-C    In a face-to-face encounter today, DEV Farooq MD performed a history and physical examination, discussed pertinent diagnostic studies if indicated, and  discussed diagnosis and management strategies with both the patient and the midlevel provider.  I reviewed the midlevel's note and agree with the documented findings and plan of care.  Greater than 50% of the evaluation and treatment decision was performed by the physician myself during today's visit.    Meagan still having persistent pain in multiple joints.  She feels very stiff and achy in the morning.  I recommended a rheumatoid panel as well as evaluation by the rheumatology team as I do not see any indications for surgical intervention at this time.  She wants to avoid taking medicines as well.  We will get her a second knee brace that does give her some support when active.  She can see us back as needed.      This note was generated with voice recognition software and may contain grammatical errors.

## 2024-07-16 ENCOUNTER — HOSPITAL ENCOUNTER (OUTPATIENT)
Dept: RADIOLOGY | Facility: HOSPITAL | Age: 49
Discharge: HOME | End: 2024-07-16
Payer: COMMERCIAL

## 2024-07-16 VITALS — WEIGHT: 110 LBS | BODY MASS INDEX: 23.09 KG/M2 | HEIGHT: 58 IN

## 2024-07-16 DIAGNOSIS — Z12.31 BREAST CANCER SCREENING BY MAMMOGRAM: ICD-10-CM

## 2024-07-16 PROCEDURE — 77063 BREAST TOMOSYNTHESIS BI: CPT | Performed by: RADIOLOGY

## 2024-07-16 PROCEDURE — 77067 SCR MAMMO BI INCL CAD: CPT | Performed by: RADIOLOGY

## 2024-07-16 PROCEDURE — 77067 SCR MAMMO BI INCL CAD: CPT

## 2024-07-24 DIAGNOSIS — G43.711 CHRONIC MIGRAINE WITHOUT AURA, INTRACTABLE, WITH STATUS MIGRAINOSUS: ICD-10-CM

## 2024-07-24 RX ORDER — TOPIRAMATE 100 MG/1
100 TABLET, FILM COATED ORAL NIGHTLY
Qty: 90 TABLET | Refills: 0 | Status: SHIPPED | OUTPATIENT
Start: 2024-07-24

## 2024-07-31 ENCOUNTER — TELEPHONE (OUTPATIENT)
Dept: NEUROLOGY | Facility: CLINIC | Age: 49
End: 2024-07-31
Payer: COMMERCIAL

## 2024-07-31 DIAGNOSIS — G43.711 CHRONIC MIGRAINE WITHOUT AURA, INTRACTABLE, WITH STATUS MIGRAINOSUS: ICD-10-CM

## 2024-07-31 NOTE — TELEPHONE ENCOUNTER
Called to refill topiramate (Topamax) 25 mg tablet.  Pharmacy is   [facility]/pharmacy #[id number] - [place], [place] - [address] AT [facility] Phone: [phone]

## 2024-08-01 RX ORDER — TOPIRAMATE 100 MG/1
100 TABLET, FILM COATED ORAL NIGHTLY
Qty: 90 TABLET | Refills: 0 | Status: SHIPPED | OUTPATIENT
Start: 2024-08-01

## 2024-09-03 ENCOUNTER — LAB (OUTPATIENT)
Dept: LAB | Facility: LAB | Age: 49
End: 2024-09-03
Payer: COMMERCIAL

## 2024-09-03 ENCOUNTER — APPOINTMENT (OUTPATIENT)
Dept: RHEUMATOLOGY | Facility: CLINIC | Age: 49
End: 2024-09-03
Payer: COMMERCIAL

## 2024-09-03 ENCOUNTER — HOSPITAL ENCOUNTER (OUTPATIENT)
Dept: RADIOLOGY | Facility: CLINIC | Age: 49
Discharge: HOME | End: 2024-09-03
Payer: COMMERCIAL

## 2024-09-03 VITALS
SYSTOLIC BLOOD PRESSURE: 136 MMHG | BODY MASS INDEX: 24.24 KG/M2 | DIASTOLIC BLOOD PRESSURE: 91 MMHG | RESPIRATION RATE: 18 BRPM | WEIGHT: 116 LBS | TEMPERATURE: 97.4 F | HEART RATE: 71 BPM

## 2024-09-03 DIAGNOSIS — M12.9 ARTHROPATHY: ICD-10-CM

## 2024-09-03 DIAGNOSIS — M25.50 MULTIPLE JOINT PAIN: ICD-10-CM

## 2024-09-03 LAB
25(OH)D3 SERPL-MCNC: 28 NG/ML (ref 30–100)
CK SERPL-CCNC: 66 U/L (ref 0–215)
CRP SERPL-MCNC: 0.56 MG/DL
ERYTHROCYTE [SEDIMENTATION RATE] IN BLOOD BY WESTERGREN METHOD: <1 MM/H (ref 0–20)
PROT SERPL-MCNC: 6.9 G/DL (ref 6.4–8.2)
RHEUMATOID FACT SER NEPH-ACNC: <10 IU/ML (ref 0–15)
URATE SERPL-MCNC: 3.8 MG/DL (ref 2.3–6.7)
VIT B12 SERPL-MCNC: 390 PG/ML (ref 211–911)

## 2024-09-03 PROCEDURE — 99204 OFFICE O/P NEW MOD 45 MIN: CPT | Performed by: INTERNAL MEDICINE

## 2024-09-03 PROCEDURE — 72040 X-RAY EXAM NECK SPINE 2-3 VW: CPT

## 2024-09-03 PROCEDURE — 1036F TOBACCO NON-USER: CPT | Performed by: INTERNAL MEDICINE

## 2024-09-03 PROCEDURE — 36415 COLL VENOUS BLD VENIPUNCTURE: CPT

## 2024-09-03 PROCEDURE — 72040 X-RAY EXAM NECK SPINE 2-3 VW: CPT | Performed by: RADIOLOGY

## 2024-09-03 ASSESSMENT — PAIN SCALES - GENERAL: PAINLEVEL: 0-NO PAIN

## 2024-09-03 NOTE — PROGRESS NOTES
Initial Rheumatology Patient Visit    Chief Complaint:  Indu Sinclair is a 48 y.o. female presenting today for Establish Care.    History of Presenting Problem:   48 y.o. female with Hx of chronic bilateral arm /hand pain x 1 year presents for evaluation. She report she does not recall any incident or illness prior to this starting, right is worse than the left. She report she has been having issues with using her upper arms, working out or sleeping. She report she has to uses a massage tool to help her muscles.   She report Hx of cervical pain  and has been diagnosis with with DJD of the cercival spine. She report she did use to do intense neck work out and was seeing chiropractors in the past.  She report 600 mg of ibuprofen and it seem to help about 50%.    Problem List:   Patient Active Problem List   Diagnosis    Abnormal tympanogram    Acute recurrent otitis media    Cervical neuritis    Cervicogenic headache    Chronic GERD    Chronic migraine without aura, with intractable migraine, so stated, with status migrainosus    Conductive hearing loss of left ear with unrestricted hearing of right ear    Hypovitaminosis D    Tympanosclerosis of left ear    Vertigo    Uses birth control    Lateral epicondylitis of both elbows    Melasma       Past Medical History:   Past Medical History:   Diagnosis Date    Abnormal tympanogram 08/29/2023    Cervicogenic headache     Chronic GERD 08/29/2023    Chronic migraine without aura, with intractable migraine, so stated, with status migrainosus 08/29/2023    Conductive hearing loss of left ear with unrestricted hearing of right ear 08/29/2023    Hypovitaminosis D 08/29/2023    Lateral epicondylitis of both elbows 11/20/2023    Other conditions influencing health status 10/05/2022    Chronic migraine    Other specified health status     No pertinent past medical history    Tympanosclerosis of left ear 08/29/2023    Uses birth control 08/29/2023    Vertigo 08/29/2023        Surgical History:   Past Surgical History:   Procedure Laterality Date    INNER EAR SURGERY  06/18/2018    x 2        Allergies: No Known Allergies    Medications:   Current Outpatient Medications:     aspirin-acetaminophen-caffeine (Excedrin Migraine) 250-250-65 mg tablet, Take 1 tablet by mouth every 6 hours if needed for headaches., Disp: , Rfl:     diphenhydrAMINE-acetaminophen (Tylenol PM Extra Strength)  mg per tablet, Tylenol PM Extra Strength 500-25 MG Oral Tablet Refills: 0  Start: 5-Oct-2022, Disp: , Rfl:     Lactobac 42-Bifid 6-lypbrb-LQV (Probiotic Plus Colostrum) -50 mg powder in packet, Probiotic Oral Packet Refills: 0  Start: 5-Oct-2022, Disp: , Rfl:     multivitamin-min-iron-FA-vit K (Adults Multivitamin) 18 mg iron-400 mcg-25 mcg tablet, Multivitamin Adults Oral Tablet Refills: 0  Start: 5-Oct-2022, Disp: , Rfl:     norethindrone (Micronor) 0.35 mg tablet, Take 1 tablet (0.35 mg) by mouth once daily., Disp: 84 tablet, Rfl: 3    topiramate (Topamax) 100 mg tablet, Take 1 tablet (100 mg) by mouth once daily at bedtime., Disp: 90 tablet, Rfl: 0    tretinoin (Retin-A) 0.05 % cream, A pea-sized amount to cover the whole face; start every 2-3 nights and gradually increase to nightly 2 months on 2 months off, Disp: 305 g, Rfl: 3    topiramate (Topamax) 25 mg tablet, Take 1 tablet (25 mg) by mouth once daily as needed., Disp: , Rfl:     Review of Systems:   ROS: She report  muscle stiffness, Denies  joint pain , Denies dry eyes and mouth, Denies oral, nasal or genital ulcers, Denies sun sensitivity, Denies Raynaud's phenomenon. Denies Uveitis or recent vision changes. Denies new rashes or Hx of Psoriasis, Denies alopecia,   Denies Chest pain/SOB, cough, Hx of asthma, Denies Fever/chills/sweats, She report Fatigue, Denies  weight loss  Denies abdominal pain, New onset Dyspepsia, dysphasia, nausea/vomiting/diarrhea, dark/tarry stools, blood in stools or urine. Denies Chronic back pain.    Denies Hx of blood clot or miscarriages. Denies Hx of easy bruising or bleeding. She report chronic Headaches, She report numbness and tingling,in her bilateral extremities. She report weakness in hand  .  All other systems have been reviewed and are negative for complaint.     Objective   Physical Examination:    Visit Vitals  BP (!) 136/91   Pulse 71   Temp 36.3 °C (97.4 °F)   Resp 18   Wt 52.6 kg (116 lb)   BMI 24.24 kg/m²   OB Status Having periods   Smoking Status Never   BSA 1.47 m²        Gen: NAD, A&O x 3  HEENT: clear sclera and conjunctiva,     Musculoskeletal:   Neck; WNL, full ROM  Shoulder: WNL, full ROM  Elbow:WNL, full ROM, no effusion noted  Wrist and fingers;no active synovitis noted, Full ROM in the Wrist , Good fist and   Knees:  No effusions or crepitation, full ROM.  Hips; WNL, full ROM, Negative Nito test  Ankle, Feet; WNL, full ROM    Skin: No rashes or lesions seen, no nail changes  Neuro: A&O x3, Normal Gait    Procedures :None    Orders:  Orders Placed This Encounter   Procedures    XR cervical spine 2-3 views    Citrulline Antibody, IgG    Vitamin B12    Vitamin D 25-Hydroxy,Total (for eval of Vitamin D levels)    Serum Protein Electrophoresis + Immunofixation    Creatine Kinase        Provider Impression:   Assessment/Plan   Encounter Diagnosis   Name Primary?    Arthropathy         48 y.o. female with Hx of chronic bilateral arm /hand pain x 1 year presents for evaluation.  Patient is complaining of myalgias and paresthesias in her hands worse on the right.  Suspect cervical radiculopathy, rule out inflammatory/connective tissue disease.  -Obtain additional serology  -Obtain cervical imaging  -Continue current NSAID  -Follow-up based on testing

## 2024-09-04 LAB
ANA SER QL HEP2 SUBST: NEGATIVE
CCP IGG SERPL-ACNC: <1 U/ML

## 2024-09-05 DIAGNOSIS — R53.1 PARESTHESIAS WITH SUBJECTIVE WEAKNESS: ICD-10-CM

## 2024-09-05 DIAGNOSIS — G72.9 MYOPATHY: Primary | ICD-10-CM

## 2024-09-05 DIAGNOSIS — R20.2 PARESTHESIAS WITH SUBJECTIVE WEAKNESS: ICD-10-CM

## 2024-09-06 LAB
ALBUMIN: 4.3 G/DL (ref 3.4–5)
ALPHA 1 GLOBULIN: 0.2 G/DL (ref 0.2–0.6)
ALPHA 2 GLOBULIN: 0.5 G/DL (ref 0.4–1.1)
BETA GLOBULIN: 0.8 G/DL (ref 0.5–1.2)
GAMMA GLOBULIN: 1 G/DL (ref 0.5–1.4)
IMMUNOFIXATION COMMENT: NORMAL
PATH REVIEW - SERUM IMMUNOFIXATION: NORMAL
PATH REVIEW-SERUM PROTEIN ELECTROPHORESIS: NORMAL
PROTEIN ELECTROPHORESIS COMMENT: NORMAL

## 2024-09-30 ENCOUNTER — TELEPHONE (OUTPATIENT)
Dept: NEUROLOGY | Facility: CLINIC | Age: 49
End: 2024-09-30
Payer: COMMERCIAL

## 2024-09-30 DIAGNOSIS — G43.711 CHRONIC MIGRAINE WITHOUT AURA, INTRACTABLE, WITH STATUS MIGRAINOSUS: ICD-10-CM

## 2024-09-30 NOTE — TELEPHONE ENCOUNTER
Called to refill topiramate (Topamax) 25 mg tablet.  Pharmacy is   [facility]/pharmacy #[id number] - [place], [place] - [address] AT [facility] Phone: [phone]

## 2024-10-01 ENCOUNTER — TELEMEDICINE (OUTPATIENT)
Dept: NEUROLOGY | Facility: CLINIC | Age: 49
End: 2024-10-01
Payer: COMMERCIAL

## 2024-10-01 DIAGNOSIS — G43.711 CHRONIC MIGRAINE WITHOUT AURA, WITH INTRACTABLE MIGRAINE, SO STATED, WITH STATUS MIGRAINOSUS: Primary | ICD-10-CM

## 2024-10-01 PROCEDURE — 99214 OFFICE O/P EST MOD 30 MIN: CPT

## 2024-10-01 RX ORDER — METHYLPREDNISOLONE 4 MG/1
TABLET ORAL
Qty: 21 TABLET | Refills: 0 | Status: SHIPPED | OUTPATIENT
Start: 2024-10-01 | End: 2024-10-08

## 2024-10-01 RX ORDER — TOPIRAMATE 25 MG/1
25 TABLET ORAL NIGHTLY
Qty: 90 TABLET | Refills: 3 | Status: SHIPPED | OUTPATIENT
Start: 2024-10-01 | End: 2025-10-01

## 2024-10-01 RX ORDER — TOPIRAMATE 25 MG/1
25 TABLET ORAL DAILY PRN
Qty: 90 TABLET | Refills: 1 | OUTPATIENT
Start: 2024-10-01

## 2024-10-01 NOTE — ASSESSMENT & PLAN NOTE
Orders:    methylPREDNISolone (Medrol Dospak) 4 mg tablets; Follow schedule on package instructions    topiramate (Topamax) 25 mg tablet; Take 1 tablet (25 mg) by mouth once daily at bedtime.    rimegepant (Nurtec ODT) 75 mg tablet,disintegrating; Take 1 tablet (75 mg) by mouth once daily as needed (for acute migraine, no more than 1 dose in 24 hours).     (4) rarely moist

## 2024-10-01 NOTE — PROGRESS NOTES
Subjective     HPI  Indu Sinclair is a 49 y.o. year old female who presents with chief complaint Chronic migraine without aura, with intractable migraine, so stated, with status migrainosus [G43.711]    Has been dealing with RA/ muscle pain  Has  not heard back about test results  December has appt with pain managmet.    Indu states her headaches gradually worsening in frequency and severity. Having daily headaches, 30 headache days per month. States that Topamax has been beneficial, more effective when taking the PRN 25 mg dose. No glaring side effects noticed at this dose.   The headaches are usually sharp and tightness/ tension  and are located in the frontal area near eyes. , generally symmetric.   The patient rates the most severe headaches a 8 in intensity. Currently using Tylenol and Advil does not rosalinda headache. Using Advil almost every day.  Generally, headaches last about 6 hours in duration.   Associated nausea, photophobia, and phonophobia. Vision changes-  not crisp.  Headaches are worsened with exertion.   Triggers include barometric pressure  and stress.  No aura.  Caffeine: Drinking matcha tea throughout  Sleep: 5- 6 hours of sleep per night. Never been a good sleeper.   Had another xray done a week ago/ chiropractor.-- arthritis in neck.    Medications  Current & Past Treatments:  Preventive:  Topamax 100 at night   Topamax 25 as needed  Tizanidine in the past- was something she didn't like    No trigger, no nerve block,  no Botox yet  Rescue:  Ice   OTC meds  Advil   tylenol  Ubrelvy- not effective  Rizatriptan- nightmares      Current Outpatient Medications:     aspirin-acetaminophen-caffeine (Excedrin Migraine) 250-250-65 mg tablet, Take 1 tablet by mouth every 6 hours if needed for headaches., Disp: , Rfl:     diphenhydrAMINE-acetaminophen (Tylenol PM Extra Strength)  mg per tablet, Tylenol PM Extra Strength 500-25 MG Oral Tablet Refills: 0  Start: 5-Oct-2022, Disp: , Rfl:      Lactobac 42-Bifid 9-ivxpzo-DJR (Probiotic Plus Colostrum) -50 mg powder in packet, Probiotic Oral Packet Refills: 0  Start: 5-Oct-2022, Disp: , Rfl:     methylPREDNISolone (Medrol Dospak) 4 mg tablets, Follow schedule on package instructions, Disp: 21 tablet, Rfl: 0    multivitamin-min-iron-FA-vit K (Adults Multivitamin) 18 mg iron-400 mcg-25 mcg tablet, Multivitamin Adults Oral Tablet Refills: 0  Start: 5-Oct-2022, Disp: , Rfl:     norethindrone (Micronor) 0.35 mg tablet, Take 1 tablet (0.35 mg) by mouth once daily., Disp: 84 tablet, Rfl: 3    rimegepant (Nurtec ODT) 75 mg tablet,disintegrating, Take 1 tablet (75 mg) by mouth once daily as needed (for acute migraine, no more than 1 dose in 24 hours)., Disp: 16 tablet, Rfl: 11    topiramate (Topamax) 100 mg tablet, Take 1 tablet (100 mg) by mouth once daily at bedtime., Disp: 90 tablet, Rfl: 0    topiramate (Topamax) 25 mg tablet, Take 1 tablet (25 mg) by mouth once daily at bedtime., Disp: 90 tablet, Rfl: 3    tretinoin (Retin-A) 0.05 % cream, A pea-sized amount to cover the whole face; start every 2-3 nights and gradually increase to nightly 2 months on 2 months off, Disp: 305 g, Rfl: 3    Past Medical History:   Diagnosis Date    Abnormal tympanogram 08/29/2023    Cervicogenic headache     Chronic GERD 08/29/2023    Chronic migraine without aura, with intractable migraine, so stated, with status migrainosus 08/29/2023    Conductive hearing loss of left ear with unrestricted hearing of right ear 08/29/2023    Hypovitaminosis D 08/29/2023    Lateral epicondylitis of both elbows 11/20/2023    Other conditions influencing health status 10/05/2022    Chronic migraine    Other specified health status     No pertinent past medical history    Tympanosclerosis of left ear 08/29/2023    Uses birth control 08/29/2023    Vertigo 08/29/2023     Past Surgical History:   Procedure Laterality Date    INNER EAR SURGERY  06/18/2018    x 2     Family History   Problem  Relation Name Age of Onset    Hypertension Mother      Lung cancer Mother      Brain cancer Mother      Hypertension Father      Heart attack Father      No Known Problems Sister      Hyperlipidemia Sister      No Known Problems Brother      No Known Problems Brother      Other (Suicide) Brother      No Known Problems Brother      No Known Problems Brother      No Known Problems Brother      No Known Problems Brother      No Known Problems Son          age 27     Social History     Tobacco Use    Smoking status: Never     Passive exposure: Never    Smokeless tobacco: Never   Substance Use Topics    Alcohol use: Never     Social History     Substance and Sexual Activity   Drug Use Never      ROS  As noted in HPI, otherwise all other systems have been reviewed are negative for complaint.     General Appearance: Indu is well-developed, well-nourished, 49 y.o. year old female, in no acute distress. Makes good eye contact, is alert, interactive, and cooperative. Demonstrates recent & remote memory recall. Subjective information consistent with objective assessment.     There were no vitals filed for this visit.    Lab Results   Component Value Date    WBC 9.4 03/22/2024    RBC 4.35 03/22/2024    HGB 14.3 03/22/2024    HCT 39.5 03/22/2024     03/22/2024     03/22/2024    K 4.1 03/22/2024     (H) 03/22/2024    BUN 16 03/22/2024    CREATININE 0.73 03/22/2024    EGFR >90 03/22/2024    CALCIUM 8.6 03/22/2024    ALKPHOS 50 03/22/2024    AST 17 03/22/2024    ALT 19 03/22/2024    TPLESGPW48 390 09/03/2024    VITD25 28 (L) 09/03/2024    LDLCALC 69 03/22/2024    CHOL 162 03/22/2024    HDL 59.6 03/22/2024    TRIG 167 (H) 03/22/2024    TSH 1.25 03/22/2024      Limited exam due to virtual visit.   Neurological Exam    Cranial Nerves  CN III, IV, VI: Extraocular movements intact bilaterally. Normal lids and orbits bilaterally.  CN VII: Full and symmetric facial movement.  CN VIII: Hearing is  normal.      Assessment & Plan  Chronic migraine without aura, with intractable migraine, so stated, with status migrainosus    Orders:    methylPREDNISolone (Medrol Dospak) 4 mg tablets; Follow schedule on package instructions    topiramate (Topamax) 25 mg tablet; Take 1 tablet (25 mg) by mouth once daily at bedtime.    rimegepant (Nurtec ODT) 75 mg tablet,disintegrating; Take 1 tablet (75 mg) by mouth once daily as needed (for acute migraine, no more than 1 dose in 24 hours).      ASSESSMENT/PLAN  Medrol Dosepak to break current HA cycle.  Increase Topamax to 125 x 2 weeks, then up to to 150 mg if not achieving a decrease in headaches. However, if side effects become an issue, do not increase dose.   Nurtec PRN     Follow up with me 6 weeks        Lor Ott, DENISSE-CNP

## 2024-10-01 NOTE — PATIENT INSTRUCTIONS
Dear Indu,    Thank you for coming to [facility]. It was a pleasure caring for you today.  The best way to contact me for medication refills or questions about your care is through [employer].  Otherwise, you can contact the office by phone: [phone].    Lor Ott, APRN-CNP    Migraines:    Suggestions for preventing or controlling migraines:  Riboflavin (vitamin B2) 200 mg twice a day may be helpful. Side effects can include diarrhea, increased urination and bright yellow urine. This should not be taken by kids.   CoQ10 100 mg daily up to three times per day may also be helpful. Side effects can include nausea, diarrhea, and dyspepsia.   Magnesium (oxelate) 400- 600 mg daily for prevention. Magnesium can also help with menstrual migraines. Side effects can include diarrhea and flushing.   According to the American Academy of Neurology, there is not sufficient evidence that melatonin helps prevent or treat migraines, so it is not currently recommended for this use. Butterbur is also not currently recommended for migraine prevention as it can cause liver toxicity.   Avoid triggers that can cause or worsen migraines (food, lack of sleep, stress, etc.)  Headache frequency is noted to be increased in those with low physical activity, poor sleep, high BMI, smoking, and caffeine overuse. Managing stress with relaxation techniques and getting 20-30 minutes of aerobic exercise at least 4 times per week can help decrease headache frequency and intensity.   Keep a diary of your headaches to note triggers, how often you get them, how long they last, associated symptoms, what medicines you took and if they helped, and any other helpful information   Avoid taking rescue medication (NOT preventative medication) more than 3 days a week (includes both prescription and over the counter meds)  Take your preventative medication as directed. Let me know if you have side effects or problems with the  medication. Do not suddenly stop the medication.     Medication Overuse Headaches:  Medication-overuse headache may occur in people who have frequent migraine, cluster, or tension-type headaches, which leads them to overuse pain medications. A vicious cycle occurs in which frequent headaches cause the person to take medication frequently (often non-prescription medication), which then causes a rebound headache as the medication wears off, causing more medication use, and so on. Medication overuse headache can even occur if a person is taking daily analgesics to treat other areas of pain, as the body does not care why the person is taking the medication. This headache from frequent medication use is a result of the body's dependence on the medication.    Medication overuse headache: Using combination analgesics, opioids or triptans >= 10 days/month, or acetaminophen/ or NSAIDs >= 15 days/month.    Overuse of any number of pain medications can increase the risk of developing medication-overuse headaches. To avoid medication-overuse headaches, we recommend the following:  If possible, avoid medications that contain butalbital (sample brand name: Fioricet) and opioids completely.  Do not use triptans or aspirin/acetaminophen/caffeine combinations (sample brand name: Excedrin) more than nine days per month.  Do not use nonsteroidal antiinflammatory drugs (NSAIDs) more than 14 days per month.      *If you are having difficulty breaking the cycle of medication overuse headaches, please message me through [employer] or call the office.     CGRP (calcitonin gene-related peptide) is a molecule in your brain that appears to be related to neurogenic inflammation and pain transmission in people who suffer from migraines.  There are 2 oral anti-CGRP medications available for acute migraine treatment: Nurtec and Ubrelvy. These are small molecule CRGP antagonists.         Nurtec is taken orally or disintegrated in mouth, 75 mg per  "dose, no more than one dose in 24 hours. Side effects are rare but may cause nausea.            You can visit [email or web address] to sign up for a Nurtec ODT Copay Card. This can be downloaded to your phone, or printed. Please then contact your pharmacy to give them your savings card information and have it applied to your prescription.   If your prescription was sent to  Specialty Pharmacy, you can contact them at [phone] to have the savings card applied to your prescription.    *Patients are not eligible to use this card if they are enrolled in a state or federally funded insurance program, including but not limited to Medicare, Medicaid, ,  Affairs health care, a state prescription drug assistance program, or the Government Health Insurance Plan available in [place] (formerly known as \"[place]").     "

## 2024-10-14 ENCOUNTER — TELEPHONE (OUTPATIENT)
Dept: PRIMARY CARE | Facility: CLINIC | Age: 49
End: 2024-10-14
Payer: COMMERCIAL

## 2024-10-14 DIAGNOSIS — Z71.84 TRAVEL ADVICE ENCOUNTER: Primary | ICD-10-CM

## 2024-10-14 NOTE — PROGRESS NOTES
Current Outpatient Medications   Medication Sig Dispense Refill    aspirin-acetaminophen-caffeine (Excedrin Migraine) 250-250-65 mg tablet Take 1 tablet by mouth every 6 hours if needed for headaches.      diphenhydrAMINE-acetaminophen (Tylenol PM Extra Strength)  mg per tablet Tylenol PM Extra Strength 500-25 MG Oral Tablet  Refills: 0  Start: 5-Oct-2022      Lactobac 42-Bifid 9-bfrtjf-VWM (Probiotic Plus Colostrum) -50 mg powder in packet Probiotic Oral Packet  Refills: 0  Start: 5-Oct-2022      multivitamin-min-iron-FA-vit K (Adults Multivitamin) 18 mg iron-400 mcg-25 mcg tablet Multivitamin Adults Oral Tablet  Refills: 0  Start: 5-Oct-2022      norethindrone (Micronor) 0.35 mg tablet Take 1 tablet (0.35 mg) by mouth once daily. 84 tablet 3    rimegepant (Nurtec ODT) 75 mg tablet,disintegrating Take 1 tablet (75 mg) by mouth once daily as needed (for acute migraine, no more than 1 dose in 24 hours). 16 tablet 11    topiramate (Topamax) 100 mg tablet Take 1 tablet (100 mg) by mouth once daily at bedtime. 90 tablet 0    topiramate (Topamax) 25 mg tablet Take 1 tablet (25 mg) by mouth once daily at bedtime. 90 tablet 3    tretinoin (Retin-A) 0.05 % cream A pea-sized amount to cover the whole face; start every 2-3 nights and gradually increase to nightly 2 months on 2 months off 305 g 3     No current facility-administered medications for this visit.

## 2024-10-14 NOTE — TELEPHONE ENCOUNTER
Pt called and stated that she is going to the [facility] the day after aaron for 3 weeks and was wondering if there where any shots she should get before going?

## 2024-10-28 ENCOUNTER — TELEPHONE (OUTPATIENT)
Dept: PRIMARY CARE | Facility: CLINIC | Age: 49
End: 2024-10-28
Payer: COMMERCIAL

## 2024-11-05 ENCOUNTER — APPOINTMENT (OUTPATIENT)
Dept: NEUROLOGY | Facility: CLINIC | Age: 49
End: 2024-11-05
Payer: COMMERCIAL

## 2024-11-18 ENCOUNTER — APPOINTMENT (OUTPATIENT)
Dept: NEUROLOGY | Facility: CLINIC | Age: 49
End: 2024-11-18
Payer: COMMERCIAL

## 2024-12-17 ENCOUNTER — APPOINTMENT (OUTPATIENT)
Dept: NEUROLOGY | Facility: CLINIC | Age: 49
End: 2024-12-17
Payer: COMMERCIAL

## 2024-12-17 DIAGNOSIS — G43.711 CHRONIC MIGRAINE WITHOUT AURA, WITH INTRACTABLE MIGRAINE, SO STATED, WITH STATUS MIGRAINOSUS: Primary | ICD-10-CM

## 2024-12-17 PROCEDURE — 99213 OFFICE O/P EST LOW 20 MIN: CPT

## 2024-12-17 RX ORDER — NARATRIPTAN 2.5 MG/1
2.5 TABLET ORAL ONCE AS NEEDED
Qty: 9 TABLET | Refills: 5 | Status: SHIPPED | OUTPATIENT
Start: 2024-12-17 | End: 2025-01-16

## 2024-12-17 RX ORDER — METHYLPREDNISOLONE 4 MG/1
TABLET ORAL
Qty: 21 TABLET | Refills: 0 | Status: SHIPPED | OUTPATIENT
Start: 2024-12-17 | End: 2024-12-23

## 2024-12-17 NOTE — ASSESSMENT & PLAN NOTE
Orders:    naratriptan (Amerge) 2.5 mg tablet; Take 1 tablet (2.5 mg) by mouth 1 time if needed for migraine (May repeat x 1 after 4 hours.) for up to 54 doses.    methylPREDNISolone (Medrol Dospak) 4 mg tablets; Follow schedule on package instructions

## 2024-12-17 NOTE — PROGRESS NOTES
Virtual or Telephone Consent    An interactive audio and video telecommunication system which permits real time communications between the patient (at the originating site) and provider (at the distant site) was utilized to provide this telehealth service.   Verbal consent was requested and obtained from Indu Sinclair on this date, 12/17/2024 for a telehealth visit.       Subjective     HPI  Indu Sinclair is a 49 y.o. year old female who presents with chief complaint Chronic migraine without aura, with intractable migraine, so stated, with status migrainosus [G43.711]    Has been dealing with RA/ muscle pain  Has  not heard back about test results  December has appt with pain managmet.    Wanting to wean off of topiramate- side effects of numbness/ tingling in fingers. Currently on 100 mg.   Endorses taking every other day.     Medrol Dosepak to break current HA cycle.  Increase Topamax to 125 x 2 weeks, then up to to 150 mg if not achieving a decrease in headaches. However, if side effects become an issue, do not increase dose.     Nurtec PRN- was taking on top     Indu states her headaches gradually worsening in frequency and severity. Having daily headaches, 30 headache days per month. HAs are tolerable. States that Topamax has been beneficial, more effective when taking the PRN 25 mg dose. No glaring side effects noticed at this dose.   The headaches are usually sharp and tightness/ tension  and are located in the frontal area near eyes. , generally symmetric.   The patient rates the most severe headaches a 8 in intensity. Currently using Tylenol and Advil does not rosalinda headache. Using Advil almost every day.  Generally, headaches last about 6 hours in duration.   Associated nausea, photophobia, and phonophobia. Vision changes-  not crisp.  Headaches are worsened with exertion.   Triggers include barometric pressure  and stress.  No aura.  Caffeine: Drinking matcha tea throughout  Sleep: 5- 6 hours of  sleep per night. Never been a good sleeper.   Had another xray done a week ago/ chiropractor.-- arthritis in neck.    Medications  Current & Past Treatments:  Preventive:  Topamax 100 at night   Topamax 25 as needed  Tizanidine in the past- was something she didn't like  No trigger, no nerve block,  no Botox yet    Rescue:  Nurtec- hit or miss.   Medrol dose Seferino was helpful  Ice   OTC meds  Advil   tylenol  Ubrelvy- not effective  Rizatriptan- nightmares  Sumatriptan- has tried      Current Outpatient Medications:     aspirin-acetaminophen-caffeine (Excedrin Migraine) 250-250-65 mg tablet, Take 1 tablet by mouth every 6 hours if needed for headaches., Disp: , Rfl:     diphenhydrAMINE-acetaminophen (Tylenol PM Extra Strength)  mg per tablet, Tylenol PM Extra Strength 500-25 MG Oral Tablet Refills: 0  Start: 5-Oct-2022, Disp: , Rfl:     Lactobac 42-Bifid 0-zprxip-BYB (Probiotic Plus Colostrum) -50 mg powder in packet, Probiotic Oral Packet Refills: 0  Start: 5-Oct-2022, Disp: , Rfl:     multivitamin-min-iron-FA-vit K (Adults Multivitamin) 18 mg iron-400 mcg-25 mcg tablet, Multivitamin Adults Oral Tablet Refills: 0  Start: 5-Oct-2022, Disp: , Rfl:     norethindrone (Micronor) 0.35 mg tablet, Take 1 tablet (0.35 mg) by mouth once daily., Disp: 84 tablet, Rfl: 3    rimegepant (Nurtec ODT) 75 mg tablet,disintegrating, Take 1 tablet (75 mg) by mouth once daily as needed (for acute migraine, no more than 1 dose in 24 hours)., Disp: 16 tablet, Rfl: 11    topiramate (Topamax) 100 mg tablet, Take 1 tablet (100 mg) by mouth once daily at bedtime., Disp: 90 tablet, Rfl: 0    topiramate (Topamax) 25 mg tablet, Take 1 tablet (25 mg) by mouth once daily at bedtime., Disp: 90 tablet, Rfl: 3    tretinoin (Retin-A) 0.05 % cream, A pea-sized amount to cover the whole face; start every 2-3 nights and gradually increase to nightly 2 months on 2 months off, Disp: 305 g, Rfl: 3    methylPREDNISolone (Medrol Dospak) 4 mg  tablets, Follow schedule on package instructions, Disp: 21 tablet, Rfl: 0    naratriptan (Amerge) 2.5 mg tablet, Take 1 tablet (2.5 mg) by mouth 1 time if needed for migraine (May repeat x 1 after 4 hours.) for up to 54 doses., Disp: 9 tablet, Rfl: 5    Past Medical History:   Diagnosis Date    Abnormal tympanogram 08/29/2023    Cervicogenic headache     Chronic GERD 08/29/2023    Chronic migraine without aura, with intractable migraine, so stated, with status migrainosus 08/29/2023    Conductive hearing loss of left ear with unrestricted hearing of right ear 08/29/2023    Hypovitaminosis D 08/29/2023    Lateral epicondylitis of both elbows 11/20/2023    Other conditions influencing health status 10/05/2022    Chronic migraine    Other specified health status     No pertinent past medical history    Tympanosclerosis of left ear 08/29/2023    Uses birth control 08/29/2023    Vertigo 08/29/2023     Past Surgical History:   Procedure Laterality Date    INNER EAR SURGERY  06/18/2018    x 2     Family History   Problem Relation Name Age of Onset    Hypertension Mother      Lung cancer Mother      Brain cancer Mother      Hypertension Father Rod Emmy     Heart attack Father Rod Emmy     Stroke Father Rod Emmy     No Known Problems Sister      Hyperlipidemia Sister      No Known Problems Brother      No Known Problems Brother      Other (Suicide) Brother      No Known Problems Brother      No Known Problems Brother      No Known Problems Brother      No Known Problems Brother      No Known Problems Son          age 27     Social History     Tobacco Use    Smoking status: Never     Passive exposure: Never    Smokeless tobacco: Never   Substance Use Topics    Alcohol use: Never     Social History     Substance and Sexual Activity   Drug Use Never      ROS  As noted in HPI, otherwise all other systems have been reviewed are negative for complaint.     General Appearance: Indu is well-developed,  well-nourished, 49 y.o. year old female, in no acute distress. Makes good eye contact, is alert, interactive, and cooperative. Demonstrates recent & remote memory recall. Subjective information consistent with objective assessment. *Assessment limited due to virtual visit.      Lab Results   Component Value Date    WBC 9.4 03/22/2024    RBC 4.35 03/22/2024    HGB 14.3 03/22/2024    HCT 39.5 03/22/2024     03/22/2024     03/22/2024    K 4.1 03/22/2024     (H) 03/22/2024    BUN 16 03/22/2024    CREATININE 0.73 03/22/2024    EGFR >90 03/22/2024    CALCIUM 8.6 03/22/2024    ALKPHOS 50 03/22/2024    AST 17 03/22/2024    ALT 19 03/22/2024    XBYSFRXM21 390 09/03/2024    VITD25 28 (L) 09/03/2024    LDLCALC 69 03/22/2024    CHOL 162 03/22/2024    HDL 59.6 03/22/2024    TRIG 167 (H) 03/22/2024    TSH 1.25 03/22/2024      Limited exam due to virtual visit.   Neurological Exam    Cranial Nerves  CN III, IV, VI: Extraocular movements intact bilaterally. Normal lids and orbits bilaterally.  CN VII: Full and symmetric facial movement.  CN VIII: Hearing is normal.      Assessment & Plan  Chronic migraine without aura, with intractable migraine, so stated, with status migrainosus    Orders:    naratriptan (Amerge) 2.5 mg tablet; Take 1 tablet (2.5 mg) by mouth 1 time if needed for migraine (May repeat x 1 after 4 hours.) for up to 54 doses.    methylPREDNISolone (Medrol Dospak) 4 mg tablets; Follow schedule on package instructions      ASSESSMENT/PLAN  Medrol Dosepak to break current HA cycle / for travek  Wean topiramate. Fill the bottle of 25 mg that was sent in.   75 mg x 1 week, 50 mg x 1 week, 25 mg x 1 week  Will take Nurtec every other day as preventive.   Follow up with me 3 months    Lor Ott, APRN-CNP

## 2024-12-20 ENCOUNTER — HOSPITAL ENCOUNTER (OUTPATIENT)
Dept: NEUROLOGY | Facility: HOSPITAL | Age: 49
Discharge: HOME | End: 2024-12-20
Payer: COMMERCIAL

## 2024-12-20 DIAGNOSIS — R20.2 PARESTHESIAS WITH SUBJECTIVE WEAKNESS: ICD-10-CM

## 2024-12-20 DIAGNOSIS — R53.1 PARESTHESIAS WITH SUBJECTIVE WEAKNESS: ICD-10-CM

## 2024-12-20 DIAGNOSIS — G72.9 MYOPATHY: ICD-10-CM

## 2024-12-21 NOTE — PATIENT INSTRUCTIONS
Dear Indu,    Thank you for coming to [facility]. It was a pleasure caring for you today.  The best way to contact me for medication refills or questions about your care is through "[employer].  Otherwise, you can contact the office by phone: [phone].    Lor Ott, APRN-CNP    Medrol Dosepak to break current HA cycle / for travek  Wean topiramate. Fill the bottle of 25 mg that was sent in.   75 mg x 1 week, 50 mg x 1 week, 25 mg x 1 week  Will take Nurtec every other day as preventive.   Follow up with me 3 months

## 2024-12-23 ENCOUNTER — OFFICE VISIT (OUTPATIENT)
Facility: CLINIC | Age: 49
End: 2024-12-23
Payer: COMMERCIAL

## 2024-12-23 VITALS
BODY MASS INDEX: 24.45 KG/M2 | TEMPERATURE: 99.1 F | DIASTOLIC BLOOD PRESSURE: 84 MMHG | SYSTOLIC BLOOD PRESSURE: 126 MMHG | OXYGEN SATURATION: 97 % | WEIGHT: 117 LBS | HEART RATE: 86 BPM | RESPIRATION RATE: 16 BRPM

## 2024-12-23 DIAGNOSIS — H65.92 LEFT OTITIS MEDIA WITH EFFUSION: Primary | ICD-10-CM

## 2024-12-23 PROCEDURE — 99213 OFFICE O/P EST LOW 20 MIN: CPT | Performed by: FAMILY MEDICINE

## 2024-12-23 RX ORDER — AMOXICILLIN AND CLAVULANATE POTASSIUM 875; 125 MG/1; MG/1
875 TABLET, FILM COATED ORAL 2 TIMES DAILY
Qty: 20 TABLET | Refills: 0 | Status: SHIPPED | OUTPATIENT
Start: 2024-12-23 | End: 2025-01-02

## 2024-12-23 RX ORDER — METHYLPREDNISOLONE 4 MG/1
TABLET ORAL
Qty: 21 TABLET | Refills: 0 | Status: SHIPPED | OUTPATIENT
Start: 2024-12-23 | End: 2024-12-29

## 2024-12-23 RX ORDER — MOMETASONE FUROATE MONOHYDRATE 50 UG/1
1 SPRAY, METERED NASAL 2 TIMES DAILY
Qty: 17 G | Refills: 1 | Status: SHIPPED | OUTPATIENT
Start: 2024-12-23 | End: 2025-12-23

## 2024-12-23 NOTE — PATIENT INSTRUCTIONS
Today we have addressed your ear infection  You are getting ready to fly so we will do an antibioic, steroids and nasonex as you couldn't tolerate the flonase.     Follow up if no improvement or if symptoms worsen.    I also advise zyrtec-d or claritin - d to help on the flight

## 2024-12-23 NOTE — PROGRESS NOTES
Current Outpatient Medications   Medication Sig Dispense Refill    Lactobac 42-Bifid 4-wyurdb-GLV (Probiotic Plus Colostrum) -50 mg powder in packet Probiotic Oral Packet  Refills: 0  Start: 5-Oct-2022      multivitamin-min-iron-FA-vit K (Adults Multivitamin) 18 mg iron-400 mcg-25 mcg tablet Multivitamin Adults Oral Tablet  Refills: 0  Start: 5-Oct-2022      naratriptan (Amerge) 2.5 mg tablet Take 1 tablet (2.5 mg) by mouth 1 time if needed for migraine (May repeat x 1 after 4 hours.) for up to 54 doses. 9 tablet 5    norethindrone (Micronor) 0.35 mg tablet Take 1 tablet (0.35 mg) by mouth once daily. 84 tablet 3    rimegepant (Nurtec ODT) 75 mg tablet,disintegrating Take 1 tablet (75 mg) by mouth once daily as needed (for acute migraine, no more than 1 dose in 24 hours). 16 tablet 11    topiramate (Topamax) 100 mg tablet Take 1 tablet (100 mg) by mouth once daily at bedtime. 90 tablet 0    topiramate (Topamax) 25 mg tablet Take 1 tablet (25 mg) by mouth once daily at bedtime. 90 tablet 3    tretinoin (Retin-A) 0.05 % cream A pea-sized amount to cover the whole face; start every 2-3 nights and gradually increase to nightly 2 months on 2 months off 305 g 3    aspirin-acetaminophen-caffeine (Excedrin Migraine) 250-250-65 mg tablet Take 1 tablet by mouth every 6 hours if needed for headaches.      diphenhydrAMINE-acetaminophen (Tylenol PM Extra Strength)  mg per tablet Tylenol PM Extra Strength 500-25 MG Oral Tablet  Refills: 0  Start: 5-Oct-2022      methylPREDNISolone (Medrol Dospak) 4 mg tablets Follow schedule on package instructions (Patient not taking: Reported on 12/23/2024) 21 tablet 0     No current facility-administered medications for this visit.   Subjective   Patient ID: Indu Sinclair is a 49 y.o. female who presents for Earache (Left ear pain ).      Just a few days ago started with some ear pain - feels blocked, feels like it is draining.  She is going to [place] on [place].   She hasn't taken anything otc yet.  She has flonase but doesn't like the taste of it.      Earache          Review of Systems   HENT:  Positive for ear pain.        Objective   /84   Pulse 86   Temp 37.3 °C (99.1 °F)   Resp 16   Wt 53.1 kg (117 lb)   SpO2 97%   BMI 24.45 kg/m²     Physical Exam  Constitutional:       Appearance: Normal appearance.   HENT:      Head: Normocephalic.      Right Ear: Tympanic membrane normal.      Ears:      Comments: Left TM with yellow effusion posterior, TM appears immobile     Nose: Congestion and rhinorrhea present.      Mouth/Throat:      Mouth: Mucous membranes are moist.   Eyes:      Pupils: Pupils are equal, round, and reactive to light.   Cardiovascular:      Rate and Rhythm: Normal rate and regular rhythm.   Pulmonary:      Effort: Pulmonary effort is normal.      Breath sounds: Normal breath sounds.   Musculoskeletal:      Cervical back: Normal range of motion.   Skin:     General: Skin is warm.   Neurological:      General: No focal deficit present.      Mental Status: She is alert and oriented to person, place, and time.   Psychiatric:         Mood and Affect: Mood normal.         Assessment/Plan   Diagnoses and all orders for this visit:  Left otitis media with effusion  -     amoxicillin-pot clavulanate (Augmentin) 875-125 mg tablet; Take 1 tablet (875 mg) by mouth 2 times a day for 10 days.  -     methylPREDNISolone (Medrol Dospak) 4 mg tablets; Take as directed on package.  -     mometasone (Nasonex) 50 mcg/actuation nasal spray; Administer 1 spray into each nostril 2 times a day.

## 2024-12-30 ENCOUNTER — TELEPHONE (OUTPATIENT)
Dept: RHEUMATOLOGY | Facility: CLINIC | Age: 49
End: 2024-12-30
Payer: COMMERCIAL

## 2024-12-30 NOTE — TELEPHONE ENCOUNTER
Attempted to call pt to give message regarding EMG results, line didn't ring and did not allow voicemails.

## 2025-01-14 DIAGNOSIS — H65.92 LEFT OTITIS MEDIA WITH EFFUSION: ICD-10-CM

## 2025-01-17 RX ORDER — MOMETASONE FUROATE MONOHYDRATE 50 UG/1
1 SPRAY, METERED NASAL 2 TIMES DAILY
Refills: 1 | OUTPATIENT
Start: 2025-01-17 | End: 2026-01-17

## 2025-01-21 ENCOUNTER — OFFICE VISIT (OUTPATIENT)
Facility: CLINIC | Age: 50
End: 2025-01-21
Payer: COMMERCIAL

## 2025-01-21 VITALS
RESPIRATION RATE: 16 BRPM | DIASTOLIC BLOOD PRESSURE: 86 MMHG | HEART RATE: 76 BPM | TEMPERATURE: 98.5 F | SYSTOLIC BLOOD PRESSURE: 142 MMHG | OXYGEN SATURATION: 97 % | BODY MASS INDEX: 25.08 KG/M2 | WEIGHT: 120 LBS

## 2025-01-21 DIAGNOSIS — H65.22 LEFT CHRONIC SEROUS OTITIS MEDIA: Primary | ICD-10-CM

## 2025-01-21 DIAGNOSIS — I10 HYPERTENSION, UNSPECIFIED TYPE: ICD-10-CM

## 2025-01-21 PROCEDURE — 99214 OFFICE O/P EST MOD 30 MIN: CPT | Performed by: FAMILY MEDICINE

## 2025-01-21 PROCEDURE — 3079F DIAST BP 80-89 MM HG: CPT | Performed by: FAMILY MEDICINE

## 2025-01-21 PROCEDURE — 3077F SYST BP >= 140 MM HG: CPT | Performed by: FAMILY MEDICINE

## 2025-01-21 RX ORDER — LISINOPRIL 10 MG/1
5 TABLET ORAL DAILY
COMMUNITY
Start: 2025-01-18 | End: 2025-01-21 | Stop reason: SDUPTHER

## 2025-01-21 RX ORDER — AMOXICILLIN AND CLAVULANATE POTASSIUM 875; 125 MG/1; MG/1
875 TABLET, FILM COATED ORAL 2 TIMES DAILY
Qty: 20 TABLET | Refills: 0 | Status: SHIPPED | OUTPATIENT
Start: 2025-01-21 | End: 2025-01-31

## 2025-01-21 RX ORDER — GUAIFENESIN 600 MG/1
600 TABLET, EXTENDED RELEASE ORAL
COMMUNITY
Start: 2025-01-18 | End: 2025-01-25

## 2025-01-21 RX ORDER — LISINOPRIL 10 MG/1
5 TABLET ORAL DAILY
Qty: 15 TABLET | Refills: 0 | Status: SHIPPED | OUTPATIENT
Start: 2025-01-21 | End: 2025-02-20

## 2025-01-21 RX ORDER — METHYLPREDNISOLONE 4 MG/1
TABLET ORAL
COMMUNITY
Start: 2025-01-18

## 2025-01-21 RX ORDER — AMOXICILLIN 500 MG/1
500 TABLET, FILM COATED ORAL
COMMUNITY
Start: 2025-01-18 | End: 2025-01-21 | Stop reason: ALTCHOICE

## 2025-01-21 RX ORDER — PREDNISONE 20 MG/1
TABLET ORAL
Qty: 21 TABLET | Refills: 0 | Status: SHIPPED | OUTPATIENT
Start: 2025-01-21

## 2025-01-21 NOTE — PROGRESS NOTES
Current Outpatient Medications   Medication Sig Dispense Refill    aspirin-acetaminophen-caffeine (Excedrin Migraine) 250-250-65 mg tablet Take 1 tablet by mouth every 6 hours if needed for headaches.      diphenhydrAMINE-acetaminophen (Tylenol PM Extra Strength)  mg per tablet Tylenol PM Extra Strength 500-25 MG Oral Tablet  Refills: 0  Start: 5-Oct-2022      guaiFENesin (Mucinex) 600 mg 12 hr tablet 1 tablet (600 mg).      Lactobac 42-Bifid 0-rktjha-SYU (Probiotic Plus Colostrum) -50 mg powder in packet Probiotic Oral Packet  Refills: 0  Start: 5-Oct-2022      methylPREDNISolone (Medrol Dospak) 4 mg tablets TAKE 6 TABLETS ON DAY 1 AS DIRECTED ON PACKAGE AND DECREASE BY 1 TAB EACH DAY FOR A TOTAL OF 6 DAYS      mometasone (Nasonex) 50 mcg/actuation nasal spray Administer 1 spray into each nostril 2 times a day. 17 g 1    multivitamin-min-iron-FA-vit K (Adults Multivitamin) 18 mg iron-400 mcg-25 mcg tablet Multivitamin Adults Oral Tablet  Refills: 0  Start: 5-Oct-2022      norethindrone (Micronor) 0.35 mg tablet Take 1 tablet (0.35 mg) by mouth once daily. 84 tablet 3    tretinoin (Retin-A) 0.05 % cream A pea-sized amount to cover the whole face; start every 2-3 nights and gradually increase to nightly 2 months on 2 months off 305 g 3    amoxicillin-pot clavulanate (Augmentin) 875-125 mg tablet Take 1 tablet (875 mg) by mouth 2 times a day for 10 days. 20 tablet 0    lisinopril 10 mg tablet Take 0.5 tablets (5 mg) by mouth once daily. 15 tablet 0    predniSONE (Deltasone) 20 mg tablet Take 3 tabs (60mg) daily for 3 days, then take 2 tabs (40mg) daily for 3 days, then take 1 tab (20mg) daily for 3 days, then 1/2 tablet 10mg for 3 days then stop 21 tablet 0     No current facility-administered medications for this visit.   Subjective   Patient ID: Indu Sinclair is a 49 y.o. female who presents for Follow-up ([facility] Follow up ).    [place].  She had a lot  of water in the ear.  She went to [facility] and the ER  when she got back to the [place].  She was diagnosed with an ear infection.  She had been sent to the ER because of her Blood pressure.  AT the ER they gave her medication it was in the 160's.  They gave her lisinopril 10mg and told her to take 5mg.  She has been doing that since Saturday just 3 days ago.  She has a BP cuff at home.  She has to buy a new one because the one she had was giving her error messages.  She needs a smaller cough.  She has never had HTN before.  She was taking decongestants.         Review of Systems    Objective   /86   Pulse 76   Temp 36.9 °C (98.5 °F)   Resp 16   Wt 54.4 kg (120 lb)   SpO2 97%   BMI 25.08 kg/m²     Physical Exam  Constitutional:       Appearance: Normal appearance.   HENT:      Head: Normocephalic and atraumatic.      Ears:      Comments: Left TM with serous yellow fluid posterior   Right TM cloudy, flattened     Nose: Nose normal.      Mouth/Throat:      Mouth: Mucous membranes are moist.   Eyes:      Pupils: Pupils are equal, round, and reactive to light.   Cardiovascular:      Rate and Rhythm: Normal rate and regular rhythm.      Pulses: Normal pulses.      Heart sounds: No murmur heard.  Pulmonary:      Effort: Pulmonary effort is normal.      Breath sounds: Normal breath sounds.   Musculoskeletal:         General: No swelling.      Cervical back: Normal range of motion and neck supple.   Neurological:      Mental Status: She is alert.         Assessment/Plan   Diagnoses and all orders for this visit:  Left chronic serous otitis media  -     Referral to ENT; Future  -     predniSONE (Deltasone) 20 mg tablet; Take 3 tabs (60mg) daily for 3 days, then take 2 tabs (40mg) daily for 3 days, then take 1 tab (20mg) daily for 3 days, then 1/2 tablet 10mg for 3 days then stop  -     amoxicillin-pot clavulanate (Augmentin) 875-125 mg tablet; Take 1 tablet (875 mg) by mouth 2 times a day for 10 days.  Hypertension,  unspecified type  -     lisinopril 10 mg tablet; Take 0.5 tablets (5 mg) by mouth once daily.

## 2025-01-21 NOTE — PROGRESS NOTES
Subjective   Patient ID: Indu Sinclair is a 49 y.o. female who presents for No chief complaint on file..  HPI  This 49-year-old female is being seen today in the office for an evaluation to a possible ear infection.  According the history provided she has had surgery on her left ear has a conductive loss based upon assessments done by other providers last in 2023.  According to records reviewed she has a history of longstanding ear problems 2 different surgeries to repair left tympanic membrane had been done in the past.  She has baseline sensitivities to wind and water in the left ear.  Past evaluations by others had shown evidence for tympanosclerotic changes on the left with decreased mobility according to her PCPs records recently she was traveling in the [facility] was involved in water sports developed an ear infection subsequent to that trip.  She was placed on prednisone and Augmentin by the PCP.  Previously she had been placed on a Medrol Dosepak and Nasonex spray all under the assumption that this was an otitis media with effusion.  She has a history of chronic migraines and is followed by neurology.  His last audiogram from 2023 revealed evidence for basically normal hearing on the right mild conductive hearing loss on the left with type B tympanogram on the left due to the tympanosclerosis.  The patient states that while vacationing in the [facility] she contracted a significant upper respiratory tract infection and then during the latter aspect of the infection she flew home.  She has had pain in her left ear although there is been no associated change in hearing.  She has had no drainage from her ears and has been a continued upper respiratory tract drainage.  Review of Systems  A 12 point ROS has been reviewed and are negative for complaint except what is stated in the history of present illness and/or past medical history as noted in the EMR  Past Medical History:   Diagnosis Date    Abnormal  tympanogram 08/29/2023    Cervicogenic headache     Chronic GERD 08/29/2023    Chronic migraine without aura, with intractable migraine, so stated, with status migrainosus 08/29/2023    Conductive hearing loss of left ear with unrestricted hearing of right ear 08/29/2023    Hypovitaminosis D 08/29/2023    Lateral epicondylitis of both elbows 11/20/2023    Other conditions influencing health status 10/05/2022    Chronic migraine    Other specified health status     No pertinent past medical history    Tympanosclerosis of left ear 08/29/2023    Uses birth control 08/29/2023    Vertigo 08/29/2023          Current Outpatient Medications:     amoxicillin-pot clavulanate (Augmentin) 875-125 mg tablet, Take 1 tablet (875 mg) by mouth 2 times a day for 10 days., Disp: 20 tablet, Rfl: 0    aspirin-acetaminophen-caffeine (Excedrin Migraine) 250-250-65 mg tablet, Take 1 tablet by mouth every 6 hours if needed for headaches., Disp: , Rfl:     diphenhydrAMINE-acetaminophen (Tylenol PM Extra Strength)  mg per tablet, Tylenol PM Extra Strength 500-25 MG Oral Tablet Refills: 0  Start: 5-Oct-2022, Disp: , Rfl:     guaiFENesin (Mucinex) 600 mg 12 hr tablet, 1 tablet (600 mg)., Disp: , Rfl:     Lactobac 42-Bifid 1-gomlvk-VPY (Probiotic Plus Colostrum) -50 mg powder in packet, Probiotic Oral Packet Refills: 0  Start: 5-Oct-2022, Disp: , Rfl:     lisinopril 10 mg tablet, Take 0.5 tablets (5 mg) by mouth once daily., Disp: 15 tablet, Rfl: 0    methylPREDNISolone (Medrol Dospak) 4 mg tablets, TAKE 6 TABLETS ON DAY 1 AS DIRECTED ON PACKAGE AND DECREASE BY 1 TAB EACH DAY FOR A TOTAL OF 6 DAYS, Disp: , Rfl:     mometasone (Nasonex) 50 mcg/actuation nasal spray, Administer 1 spray into each nostril 2 times a day., Disp: 17 g, Rfl: 1    multivitamin-min-iron-FA-vit K (Adults Multivitamin) 18 mg iron-400 mcg-25 mcg tablet, Multivitamin Adults Oral Tablet Refills: 0  Start: 5-Oct-2022, Disp: , Rfl:     norethindrone (Micronor) 0.35  mg tablet, Take 1 tablet (0.35 mg) by mouth once daily., Disp: 84 tablet, Rfl: 3    predniSONE (Deltasone) 20 mg tablet, Take 3 tabs (60mg) daily for 3 days, then take 2 tabs (40mg) daily for 3 days, then take 1 tab (20mg) daily for 3 days, then 1/2 tablet 10mg for 3 days then stop, Disp: 21 tablet, Rfl: 0    tretinoin (Retin-A) 0.05 % cream, A pea-sized amount to cover the whole face; start every 2-3 nights and gradually increase to nightly 2 months on 2 months off, Disp: 305 g, Rfl: 3     Social History     Tobacco Use    Smoking status: Never     Passive exposure: Never    Smokeless tobacco: Never   Substance Use Topics    Alcohol use: Never        Social History     Tobacco Use    Smoking status: Never     Passive exposure: Never    Smokeless tobacco: Never   Substance Use Topics    Alcohol use: Never       No Known Allergies    There were no vitals taken for this visit.    Objective   Physical Exam  Examination:    CONSTITUTIONAL: Alert, in no acute distress, normal pitch/clarity of voice, well-developed, well-nourished, cooperative.  HEAD/FACE: Normocephalic, atraumatic, no tenderness over the sinuses, facial strength and movement symmetric.    SKIN: Good turgor, no rashes, no suspicious lesions, in the head and neck.    EYES: Both eyes have normal extraocular movements with no nystagmus, pupils are equal and reactive to light and accommodation, conjunctiva is clear.    EARS: Both external ears were negative for skin lesions or abnormalities.  There was point tenderness around the temporomandibular joint on the left.  Both ear canals were patent although dry.  On the right-hand side the tympanic membrane was intact noninflamed slightly dull with some sclerotic changes.  Left-hand side the canal was patent with no otorrhea.  There was tympanosclerotic changes more diffusely no perforation no signs of acute inflammation.    NOSE: No external skin lesions are noted, nares are patent, septum is intact and  noninflamed, nasal turbinates are normal in appearance, sinuses are nontender to palpation bilaterally, no internal lesions or polyps are noted, no discharge is noted.    OROPHARYNX/ORAL CAVITY: Mucous membranes of the oropharynx and the oral cavity proper are without lesions or ulcerations, tongue mobility is normal and no lesions are noted, gingiva and alveolar mucosa is intact without lesions, oral mucosa is moist, muscular movement of the palate and gag reflex are normal.    NECK: No lymphadenopathy is palpated, neck is supple with full range of motion, thyroid is without swelling or tenderness, trachea is midline, no neck masses are noted.    Lymphatics: No cervical adenopathy or supraclavicular adenopathy noted to palpation.    HEART/VASCULAR: No jugular venous distention is noted, carotid pulsations are intact with a regular rate and rhythm noted,    PULMONARY: Good air movement with normal inspiratory/expiratory effort is noted, no audible wheezing is appreciated.    NEUROLOGIC: Alert and oriented, cranial nerves are grossly intact, gait is normal, sensation in the head and neck is intact,    PSYCH: oriented to person, place and time, normal mood and affect.    EXTREMITIES: No motor dysfunction of the upper and lower extremity is noted.    Her audiogram today on the right ear revealed evidence for basically normal hearing across all frequencies.  Left-hand side there was a mild mixed hearing loss except for 1 to moderate reading at 8000 Hz.  Pure-tone thresholds were unchanged from 2023.  Speech audiometry revealed 100% discrimination ability at 55 dB on the right 70 on the left with 40 dB of masking.  Pentagrams revealed a normal pattern on the right normal stiffness pattern on the left  Assessment/Plan   Problem List Items Addressed This Visit    None  Visit Diagnoses         Codes    Referred otalgia of left ear    -  Primary H92.02    Left chronic serous otitis media     H65.22    TMJ pain dysfunction  syndrome     M26.629    Tympanosclerosis of both ears     H74.03    Mixed conductive and sensorineural hearing loss of left ear with unrestricted hearing of right ear     H90.72          I discussed the clinical finds the patient.  Her exam today was negative for any signs of acute inflammation involving the ears and she did not appear to have any signs of purulent discharge suggesting an upper respiratory tract infection.  Her primary care physician has started her on Augmentin and a tapered dose of prednisone 4 days ago and despite findings of infection today she is advised to continue that course until complete.  She should make sure she takes Augmentin with food and if she has any difficulties with from a GI standpoint she needs to stop it.  Lubrication of the ear canal with 1 to 2 drops of baby oil or sweet oil can help with dryness in the canal.  TMJ area should be treated and evaluated by her dentist as needed but she can use some topical Voltaren over the joint space to see if that would help reduce discomfort.  Application of heat to the area might also be of benefit.  From a hearing standpoint her pure-tone test seems comparable to the test from 2023 and she seems to be functioning well with her hearing.  She should have a recheck done yearly and if she is at a point where she is starting to sense difficulties because of the loss that she has on the left and amplify that would be an option.  I did not see any evidence for eustachian tube dysfunction at this point based on her test results.       Sandor Ball DMD, MD 01/21/25 2:30 PM

## 2025-01-21 NOTE — PATIENT INSTRUCTIONS
Today we have evaluated your ear infection which is not resolved. I will change your antibiotics to augmentin, increase your prednisone taper and then have you see ENT you may need a tube in the ear drum.  Please restart your flonase but avoid all decongestants due to your elevated bp reading    For your elevated BP which you were seen in the ER for, I reviewed your cardiac calcium score test.  I also advise that you continue your lisinopril 5mg for now and follow up in 1 month.  Between illness, travel and change in diet you may just have temporary elevated bp.  We will recheck and you will continue checking at home as well.

## 2025-01-22 ENCOUNTER — OFFICE VISIT (OUTPATIENT)
Dept: OTOLARYNGOLOGY | Facility: CLINIC | Age: 50
End: 2025-01-22
Payer: COMMERCIAL

## 2025-01-22 ENCOUNTER — CLINICAL SUPPORT (OUTPATIENT)
Dept: AUDIOLOGY | Facility: CLINIC | Age: 50
End: 2025-01-22
Payer: COMMERCIAL

## 2025-01-22 DIAGNOSIS — H92.02 REFERRED OTALGIA OF LEFT EAR: Primary | ICD-10-CM

## 2025-01-22 DIAGNOSIS — H74.03 TYMPANOSCLEROSIS OF BOTH EARS: ICD-10-CM

## 2025-01-22 DIAGNOSIS — H90.72 MIXED CONDUCTIVE AND SENSORINEURAL HEARING LOSS OF LEFT EAR WITH UNRESTRICTED HEARING OF RIGHT EAR: ICD-10-CM

## 2025-01-22 DIAGNOSIS — H65.22 LEFT CHRONIC SEROUS OTITIS MEDIA: ICD-10-CM

## 2025-01-22 DIAGNOSIS — H90.12 CONDUCTIVE HEARING LOSS OF LEFT EAR WITH UNRESTRICTED HEARING OF RIGHT EAR: Primary | ICD-10-CM

## 2025-01-22 DIAGNOSIS — M26.629 TMJ PAIN DYSFUNCTION SYNDROME: ICD-10-CM

## 2025-01-22 PROCEDURE — 1036F TOBACCO NON-USER: CPT | Performed by: OTOLARYNGOLOGY

## 2025-01-22 PROCEDURE — 99214 OFFICE O/P EST MOD 30 MIN: CPT | Performed by: OTOLARYNGOLOGY

## 2025-01-22 PROCEDURE — 92557 COMPREHENSIVE HEARING TEST: CPT | Performed by: AUDIOLOGIST

## 2025-01-22 PROCEDURE — 92567 TYMPANOMETRY: CPT | Performed by: AUDIOLOGIST

## 2025-01-22 ASSESSMENT — COLUMBIA-SUICIDE SEVERITY RATING SCALE - C-SSRS: 1. IN THE PAST MONTH, HAVE YOU WISHED YOU WERE DEAD OR WISHED YOU COULD GO TO SLEEP AND NOT WAKE UP?: NO

## 2025-01-22 ASSESSMENT — PAIN SCALES - GENERAL: PAINLEVEL_OUTOF10: 4

## 2025-01-22 ASSESSMENT — PATIENT HEALTH QUESTIONNAIRE - PHQ9
SUM OF ALL RESPONSES TO PHQ9 QUESTIONS 1 AND 2: 0
2. FEELING DOWN, DEPRESSED OR HOPELESS: NOT AT ALL
1. LITTLE INTEREST OR PLEASURE IN DOING THINGS: NOT AT ALL

## 2025-01-22 NOTE — PROGRESS NOTES
"AUDIOMETRIC EVALUATION       Name:  Indu Sinclair  :  1975  Age:  49 y.o.  Date of Evaluation:  2025     HISTORY  Indu Sinclair was seen today for a hearing evaluation due to bilateral ear pain, worse in the left ear. Reports having an ear infection then flying to [place] and snorkeling which resulted in ear fullness and pain. History of what sounds like a left tympanoplasty as a child due to a hole in the left TM. Recent audiogram in  showed a left conductive hearing loss.    Denies tinnitus, vertigo, aural drainage, fullness, history of familial hearing loss or noise exposure.    PROCEDURE:  Otoscopic Evaluation:    RIGHT: Clear ear canal and tympanic membrane visualized.  LEFT:  Clear ear canal and tympanic membrane visualized.    Immittance: Tympanometry (226 Hz probe tone) and Stapedial Acoustic Reflexes Thresholds (ART)(Probe ear):  RIGHT: Normal middle ear pressure, mobility, and ear canal volume. Ipsilateral ART -2000 Hz.  LEFT: Normal middle ear pressure, reduced mobility, and normal ear canal volume. Ipsilateral ART -2000 Hz.    Pure Tone and Speech Audiometry:    Test Technique: Pure Tone Audiometry via TDH headphones  Test Reliability: good    RIGHT: Normal hearing sensitivity 250-8000 Hz. Word Recognition score was excellent using recorded material (NU-6 10-word list ordered by difficulty).   LEFT: Mild  to moderate conductive hearing loss through 8000 Hz. Word Recognition score was excellent using recorded material (NU-6 10-word list ordered by difficulty).     EVALUATION  See scanned Audiogram in \"Media\".    IMPRESSIONS:  Today's test results indicate abnormal middle ear function and conductive hearing loss in the left ear.    RECOMMENDATIONS:  Continue medical follow-up with physician.  Return for audiologic assessment in conjunction with otologic care or annually.       PATIENT EDUCATION:   Discussed results and recommendations with Indu Sinclair.  Questions were " addressed and the patient was encouraged to contact our department ([phone]) should concerns arise.    VIC Castillo, CCC-A  Senior Clinical Audiologist    TIME: [phone]

## 2025-02-11 ENCOUNTER — OFFICE VISIT (OUTPATIENT)
Facility: CLINIC | Age: 50
End: 2025-02-11
Payer: COMMERCIAL

## 2025-02-11 VITALS
RESPIRATION RATE: 16 BRPM | WEIGHT: 119 LBS | HEART RATE: 89 BPM | BODY MASS INDEX: 24.87 KG/M2 | DIASTOLIC BLOOD PRESSURE: 82 MMHG | OXYGEN SATURATION: 97 % | TEMPERATURE: 100.2 F | SYSTOLIC BLOOD PRESSURE: 126 MMHG

## 2025-02-11 DIAGNOSIS — H66.90 ACUTE RECURRENT OTITIS MEDIA: ICD-10-CM

## 2025-02-11 DIAGNOSIS — H65.92 LEFT OTITIS MEDIA WITH EFFUSION: Primary | ICD-10-CM

## 2025-02-11 DIAGNOSIS — H74.02 TYMPANOSCLEROSIS OF LEFT EAR: ICD-10-CM

## 2025-02-11 DIAGNOSIS — R05.9 COUGH, UNSPECIFIED TYPE: ICD-10-CM

## 2025-02-11 PROCEDURE — 99213 OFFICE O/P EST LOW 20 MIN: CPT | Performed by: FAMILY MEDICINE

## 2025-02-11 RX ORDER — FLUTICASONE PROPIONATE 50 MCG
2 SPRAY, SUSPENSION (ML) NASAL DAILY
Qty: 16 G | Refills: 2 | Status: SHIPPED | OUTPATIENT
Start: 2025-02-11 | End: 2026-02-11

## 2025-02-11 RX ORDER — CEFDINIR 300 MG/1
300 CAPSULE ORAL 2 TIMES DAILY
Qty: 20 CAPSULE | Refills: 0 | Status: SHIPPED | OUTPATIENT
Start: 2025-02-11 | End: 2025-02-21

## 2025-02-11 RX ORDER — BENZONATATE 100 MG/1
100 CAPSULE ORAL 3 TIMES DAILY PRN
Qty: 42 CAPSULE | Refills: 0 | Status: SHIPPED | OUTPATIENT
Start: 2025-02-11 | End: 2025-03-13

## 2025-02-11 ASSESSMENT — ENCOUNTER SYMPTOMS: COUGH: 1

## 2025-02-11 NOTE — PATIENT INSTRUCTIONS
Today we have again treated your ear infection and sinus infection.     Please follow up with your ENT as you may need a permanent tube put in the left ear.     Follow up if no improvement or if symptoms worsen.

## 2025-02-11 NOTE — PROGRESS NOTES
Current Outpatient Medications   Medication Sig Dispense Refill    aspirin-acetaminophen-caffeine (Excedrin Migraine) 250-250-65 mg tablet Take 1 tablet by mouth every 6 hours if needed for headaches.      benzonatate (Tessalon) 100 mg capsule Take 1 capsule (100 mg) by mouth 3 times a day as needed for cough. Do not crush or chew. 42 capsule 0    cefdinir (Omnicef) 300 mg capsule Take 1 capsule (300 mg) by mouth 2 times a day for 10 days. 20 capsule 0    diphenhydrAMINE-acetaminophen (Tylenol PM Extra Strength)  mg per tablet Tylenol PM Extra Strength 500-25 MG Oral Tablet  Refills: 0  Start: 5-Oct-2022      fluticasone (Flonase Allergy Relief) 50 mcg/actuation nasal spray Administer 2 sprays into each nostril once daily. Shake gently. Before first use, prime pump. After use, clean tip and replace cap. 16 g 2    Lactobac 42-Bifid 8-vyntxy-DVG (Probiotic Plus Colostrum) -50 mg powder in packet Probiotic Oral Packet  Refills: 0  Start: 5-Oct-2022      lisinopril 10 mg tablet Take 0.5 tablets (5 mg) by mouth once daily. 15 tablet 0    mometasone (Nasonex) 50 mcg/actuation nasal spray Administer 1 spray into each nostril 2 times a day. 17 g 1    multivitamin-min-iron-FA-vit K (Adults Multivitamin) 18 mg iron-400 mcg-25 mcg tablet Multivitamin Adults Oral Tablet  Refills: 0  Start: 5-Oct-2022      norethindrone (Micronor) 0.35 mg tablet Take 1 tablet (0.35 mg) by mouth once daily. 84 tablet 3    tretinoin (Retin-A) 0.05 % cream A pea-sized amount to cover the whole face; start every 2-3 nights and gradually increase to nightly 2 months on 2 months off 305 g 3     No current facility-administered medications for this visit.   Subjective   Patient ID: Indu Sinclair is a 49 y.o. female who presents for Cough (Cough, congestion. Negative home COVID test. ).    Really bad cold/cough. Started Thursday last week.  No fever.  She isn't coughing anything up but when she blows her nose it's greenish.  Light green.   Left ear hurts.  No headaches.  Tried robitussin.  Tried vicks nyquil/dayquil.  She does feel short of breath when she is coughing a lot.  She has been working still.      Cough         Review of Systems   Respiratory:  Positive for cough.        Objective   /82   Pulse 89   Temp 37.9 °C (100.2 °F)   Resp 16   Wt 54 kg (119 lb)   SpO2 97%   BMI 24.87 kg/m²     Physical Exam  Constitutional:       Appearance: Normal appearance.   HENT:      Head: Normocephalic and atraumatic.      Right Ear: Tympanic membrane normal.      Ears:      Comments: Left TM with yellow fluid posteriorly      Nose: Congestion and rhinorrhea present.      Mouth/Throat:      Mouth: Mucous membranes are moist.   Cardiovascular:      Rate and Rhythm: Normal rate and regular rhythm.      Pulses: Normal pulses.   Pulmonary:      Effort: Pulmonary effort is normal. No respiratory distress.      Breath sounds: Normal breath sounds. No stridor. No wheezing, rhonchi or rales.   Musculoskeletal:      Cervical back: Normal range of motion and neck supple.   Skin:     General: Skin is warm and dry.   Neurological:      Mental Status: She is alert.   Psychiatric:         Mood and Affect: Mood normal.         Assessment/Plan   Diagnoses and all orders for this visit:  Left otitis media with effusion  -     fluticasone (Flonase Allergy Relief) 50 mcg/actuation nasal spray; Administer 2 sprays into each nostril once daily. Shake gently. Before first use, prime pump. After use, clean tip and replace cap.  -     cefdinir (Omnicef) 300 mg capsule; Take 1 capsule (300 mg) by mouth 2 times a day for 10 days.  Cough, unspecified type  -     benzonatate (Tessalon) 100 mg capsule; Take 1 capsule (100 mg) by mouth 3 times a day as needed for cough. Do not crush or chew.

## 2025-02-13 NOTE — PROGRESS NOTES
Subjective   Patient ID: Indu Sinclair is a 49 y.o. female who presents for No chief complaint on file..  HPI  This 49-year-old female last seen in the office in January of this year is being seen in follow-up.  she has a history of longstanding ear problems 2 different surgeries to repair left tympanic membrane had been done in the past. She has baseline sensitivities to wind and water in the left ear. Past evaluations by others had shown evidence for tympanosclerotic changes on the left with decreased mobility according to her PCPs records recently she was traveling in the [facility] was involved in water sports developed an ear infection subsequent to that trip. She was placed on prednisone and Augmentin by the PCP. Her examination when last seen was negative for any signs of acute inflammation in either ear.  Prior to being seen at her last visit she had been started on Augmentin and a prednisone taper by her primary care provider although there was no signs of infection noted when seen.  Was advised to lubricate the ear canals with 1 to 2 drops of baby oil to help counteract dryness.  There was suggestion of pain involving her ear secondary to temporomandibular joint strain and she was advised to see her dentist.  Topical Voltaren around the TMJ complex was recommended as a trial to see if that would reduce some of her discomfort.  Her hearing when checked showed no change from results dating back to 2023.  Her exam physically and audiologically did not reveal findings of eustachian tube dysfunction.  Review of Systems   A 12 point ROS  has been reviewed and are negative for complaint except for what is stated in the history of present illness and /or for past medical history as noted in the EMR.    Past Medical History:   Diagnosis Date    Abnormal tympanogram 08/29/2023    Cervicogenic headache     Chronic GERD 08/29/2023    Chronic migraine without aura, with intractable migraine, so stated, with status  migrainosus 08/29/2023    Conductive hearing loss of left ear with unrestricted hearing of right ear 08/29/2023    Hypovitaminosis D 08/29/2023    Lateral epicondylitis of both elbows 11/20/2023    Other conditions influencing health status 10/05/2022    Chronic migraine    Other specified health status     No pertinent past medical history    Tympanosclerosis of left ear 08/29/2023    Uses birth control 08/29/2023    Vertigo 08/29/2023        Current Outpatient Medications:     aspirin-acetaminophen-caffeine (Excedrin Migraine) 250-250-65 mg tablet, Take 1 tablet by mouth every 6 hours if needed for headaches., Disp: , Rfl:     benzonatate (Tessalon) 100 mg capsule, Take 1 capsule (100 mg) by mouth 3 times a day as needed for cough. Do not crush or chew., Disp: 42 capsule, Rfl: 0    cefdinir (Omnicef) 300 mg capsule, Take 1 capsule (300 mg) by mouth 2 times a day for 10 days., Disp: 20 capsule, Rfl: 0    diphenhydrAMINE-acetaminophen (Tylenol PM Extra Strength)  mg per tablet, Tylenol PM Extra Strength 500-25 MG Oral Tablet Refills: 0  Start: 5-Oct-2022, Disp: , Rfl:     fluticasone (Flonase Allergy Relief) 50 mcg/actuation nasal spray, Administer 2 sprays into each nostril once daily. Shake gently. Before first use, prime pump. After use, clean tip and replace cap., Disp: 16 g, Rfl: 2    Lactobac 42-Bifid 2-kdlhmt-XAP (Probiotic Plus Colostrum) -50 mg powder in packet, Probiotic Oral Packet Refills: 0  Start: 5-Oct-2022, Disp: , Rfl:     lisinopril 10 mg tablet, Take 0.5 tablets (5 mg) by mouth once daily., Disp: 15 tablet, Rfl: 0    mometasone (Nasonex) 50 mcg/actuation nasal spray, Administer 1 spray into each nostril 2 times a day., Disp: 17 g, Rfl: 1    multivitamin-min-iron-FA-vit K (Adults Multivitamin) 18 mg iron-400 mcg-25 mcg tablet, Multivitamin Adults Oral Tablet Refills: 0  Start: 5-Oct-2022, Disp: , Rfl:     norethindrone (Micronor) 0.35 mg tablet, Take 1 tablet (0.35 mg) by mouth once  daily., Disp: 84 tablet, Rfl: 3    tretinoin (Retin-A) 0.05 % cream, A pea-sized amount to cover the whole face; start every 2-3 nights and gradually increase to nightly 2 months on 2 months off, Disp: 305 g, Rfl: 3     Social History     Tobacco Use    Smoking status: Never     Passive exposure: Never    Smokeless tobacco: Never   Substance Use Topics    Alcohol use: Never       No Known Allergies    There were no vitals taken for this visit.    Objective   Physical Exam  CONSTITUTIONAL: Alert, in no acute distress, normal pitch/clarity of voice, well-developed, well-nourished, cooperative.  HEAD/FACE: Normocephalic, atraumatic, no tenderness over the sinuses, facial strength and movement symmetric.     SKIN: Good turgor, no rashes, no suspicious lesions, in the head and neck.     EYES: Both eyes have normal extraocular movements with no nystagmus, pupils are equal and reactive to light and accommodation, conjunctiva is clear.     EARS: Both external ears were negative for skin lesions or abnormalities.  There was point tenderness around the temporomandibular joint on the left.  Both ear canals were patent although dry.  On the right-hand side the tympanic membrane was intact noninflamed slightly dull with some sclerotic changes.  Left-hand side the canal was patent with no otorrhea.  There was tympanosclerotic changes more diffusely no perforation no signs of acute inflammation.     NOSE: No external skin lesions are noted, nares are patent, septum is intact and noninflamed, nasal turbinates are normal in appearance, sinuses are nontender to palpation bilaterally, no internal lesions or polyps are noted, no discharge is noted.     OROPHARYNX/ORAL CAVITY: Mucous membranes of the oropharynx and the oral cavity proper are without lesions or ulcerations, tongue mobility is normal and no lesions are noted, gingiva and alveolar mucosa is intact without lesions, oral mucosa is moist, muscular movement of the palate and  gag reflex are normal.     NECK: No lymphadenopathy is palpated, neck is supple with full range of motion, thyroid is without swelling or tenderness, trachea is midline, no neck masses are noted.     Lymphatics: No cervical adenopathy or supraclavicular adenopathy noted to palpation.     HEART/VASCULAR: No jugular venous distention is noted, carotid pulsations are intact with a regular rate and rhythm noted,     PULMONARY: Good air movement with normal inspiratory/expiratory effort is noted, no audible wheezing is appreciated.     NEUROLOGIC: Alert and oriented, cranial nerves are grossly intact, gait is normal, sensation in the head and neck is intact,     PSYCH: oriented to person, place and time, normal mood and affect.     EXTREMITIES: No motor dysfunction of the upper and lower extremity is noted.  Assessment/Plan   {Assess/PlanSmartLinks:56926}         Sandro Ball DMD, MD 02/13/25 1:30 PM

## 2025-02-17 ENCOUNTER — TELEPHONE (OUTPATIENT)
Dept: OBSTETRICS AND GYNECOLOGY | Facility: CLINIC | Age: 50
End: 2025-02-17

## 2025-02-17 ENCOUNTER — APPOINTMENT (OUTPATIENT)
Dept: OTOLARYNGOLOGY | Facility: CLINIC | Age: 50
End: 2025-02-17
Payer: COMMERCIAL

## 2025-02-17 DIAGNOSIS — M26.629 TMJ PAIN DYSFUNCTION SYNDROME: ICD-10-CM

## 2025-02-17 DIAGNOSIS — H90.72 MIXED CONDUCTIVE AND SENSORINEURAL HEARING LOSS OF LEFT EAR WITH UNRESTRICTED HEARING OF RIGHT EAR: ICD-10-CM

## 2025-02-17 DIAGNOSIS — Z78.9 USES BIRTH CONTROL: ICD-10-CM

## 2025-02-17 DIAGNOSIS — G44.86 CERVICOGENIC HEADACHE: ICD-10-CM

## 2025-02-17 DIAGNOSIS — H92.02 REFERRED OTALGIA OF LEFT EAR: Primary | ICD-10-CM

## 2025-02-17 DIAGNOSIS — H74.03 TYMPANOSCLEROSIS OF BOTH EARS: ICD-10-CM

## 2025-02-17 RX ORDER — NORETHINDRONE 0.35 MG/1
1 TABLET ORAL DAILY
Qty: 84 TABLET | Refills: 0 | Status: SHIPPED | OUTPATIENT
Start: 2025-02-17 | End: 2026-02-17

## 2025-02-17 NOTE — TELEPHONE ENCOUNTER
Patient needs a refill on birthcontrol, please sned to [facility]/pharmacy #[id number] - [place], [place] - [address], [facility] [address]  Phone: [phone]

## 2025-02-27 ENCOUNTER — APPOINTMENT (OUTPATIENT)
Dept: OTOLARYNGOLOGY | Facility: CLINIC | Age: 50
End: 2025-02-27
Payer: COMMERCIAL

## 2025-02-27 NOTE — PROGRESS NOTES
Subjective   Patient ID: Indu Sinclair is a 49 y.o. female who presents for No chief complaint on file..  HPI  This 49-year-old female last seen in the office in January of this year is being seen in follow-up.  she has a history of longstanding ear problems status post 2 different surgeries to repair left tympanic membrane  in the past. She has baseline sensitivities to wind and water in the left ear. Past evaluations by others had shown evidence for tympanosclerotic changes on the left with decreased mobility according to her PCPs records recently she was traveling in the [facility] was involved in water sports developed an ear infection subsequent to that trip. She was placed on prednisone and Augmentin by the PCP. Her examination when last seen was negative for any signs of acute inflammation in either ear.  Prior to being seen at her last visit she had been started on Augmentin and a prednisone taper by her primary care provider although there was no signs of infection noted when seen.  Was advised to lubricate the ear canals with 1 to 2 drops of baby oil to help counteract dryness.  There was suggestion of pain involving her ear secondary to temporomandibular joint strain and she was advised to see her dentist.  Topical Voltaren around the TMJ complex was recommended as a trial to see if that would reduce some of her discomfort.  Her hearing when checked showed no change from results dating back to 2023.  Her exam physically and audiologically did not reveal findings of eustachian tube dysfunction.   Review of Systems   A 12 point ROS  has been reviewed and are negative for complaint except for what is stated in the history of present illness and /or for past medical history as noted in the EMR.    Past Medical History:   Diagnosis Date    Abnormal tympanogram 08/29/2023    Cervicogenic headache     Chronic GERD 08/29/2023    Chronic migraine without aura, with intractable migraine, so stated, with status  migrainosus 08/29/2023    Conductive hearing loss of left ear with unrestricted hearing of right ear 08/29/2023    Hypovitaminosis D 08/29/2023    Lateral epicondylitis of both elbows 11/20/2023    Other conditions influencing health status 10/05/2022    Chronic migraine    Other specified health status     No pertinent past medical history    Tympanosclerosis of left ear 08/29/2023    Uses birth control 08/29/2023    Vertigo 08/29/2023          Current Outpatient Medications:     aspirin-acetaminophen-caffeine (Excedrin Migraine) 250-250-65 mg tablet, Take 1 tablet by mouth every 6 hours if needed for headaches., Disp: , Rfl:     benzonatate (Tessalon) 100 mg capsule, Take 1 capsule (100 mg) by mouth 3 times a day as needed for cough. Do not crush or chew., Disp: 42 capsule, Rfl: 0    diphenhydrAMINE-acetaminophen (Tylenol PM Extra Strength)  mg per tablet, Tylenol PM Extra Strength 500-25 MG Oral Tablet Refills: 0  Start: 5-Oct-2022, Disp: , Rfl:     fluticasone (Flonase Allergy Relief) 50 mcg/actuation nasal spray, Administer 2 sprays into each nostril once daily. Shake gently. Before first use, prime pump. After use, clean tip and replace cap., Disp: 16 g, Rfl: 2    Lactobac 42-Bifid 0-ieswzu-OFA (Probiotic Plus Colostrum) -50 mg powder in packet, Probiotic Oral Packet Refills: 0  Start: 5-Oct-2022, Disp: , Rfl:     lisinopril 10 mg tablet, Take 0.5 tablets (5 mg) by mouth once daily., Disp: 15 tablet, Rfl: 0    mometasone (Nasonex) 50 mcg/actuation nasal spray, Administer 1 spray into each nostril 2 times a day., Disp: 17 g, Rfl: 1    multivitamin-min-iron-FA-vit K (Adults Multivitamin) 18 mg iron-400 mcg-25 mcg tablet, Multivitamin Adults Oral Tablet Refills: 0  Start: 5-Oct-2022, Disp: , Rfl:     norethindrone (Micronor) 0.35 mg tablet, Take 1 tablet (0.35 mg) by mouth once daily., Disp: 84 tablet, Rfl: 0    tretinoin (Retin-A) 0.05 % cream, A pea-sized amount to cover the whole face; start every  2-3 nights and gradually increase to nightly 2 months on 2 months off, Disp: 305 g, Rfl: 3     No Known Allergies    There were no vitals taken for this visit.    Objective   Physical Exam  Physical Exam  CONSTITUTIONAL: Alert, in no acute distress, normal pitch/clarity of voice, well-developed, well-nourished, cooperative.  HEAD/FACE: Normocephalic, atraumatic, no tenderness over the sinuses, facial strength and movement symmetric.     SKIN: Good turgor, no rashes, no suspicious lesions, in the head and neck.     EYES: Both eyes have normal extraocular movements with no nystagmus, pupils are equal and reactive to light and accommodation, conjunctiva is clear.     EARS: Both external ears were negative for skin lesions or abnormalities.  There was point tenderness around the temporomandibular joint on the left.  Both ear canals were patent although dry.  On the right-hand side the tympanic membrane was intact noninflamed slightly dull with some sclerotic changes.  Left-hand side the canal was patent with no otorrhea.  There was tympanosclerotic changes more diffusely no perforation no signs of acute inflammation.     NOSE: No external skin lesions are noted, nares are patent, septum is intact and noninflamed, nasal turbinates are normal in appearance, sinuses are nontender to palpation bilaterally, no internal lesions or polyps are noted, no discharge is noted.     OROPHARYNX/ORAL CAVITY: Mucous membranes of the oropharynx and the oral cavity proper are without lesions or ulcerations, tongue mobility is normal and no lesions are noted, gingiva and alveolar mucosa is intact without lesions, oral mucosa is moist, muscular movement of the palate and gag reflex are normal.     NECK: No lymphadenopathy is palpated, neck is supple with full range of motion, thyroid is without swelling or tenderness, trachea is midline, no neck masses are noted.     Lymphatics: No cervical adenopathy or supraclavicular adenopathy noted to  palpation.     HEART/VASCULAR: No jugular venous distention is noted, carotid pulsations are intact with a regular rate and rhythm noted,     PULMONARY: Good air movement with normal inspiratory/expiratory effort is noted, no audible wheezing is appreciated.     NEUROLOGIC: Alert and oriented, cranial nerves are grossly intact, gait is normal, sensation in the head and neck is intact,     PSYCH: oriented to person, place and time, normal mood and affect.     EXTREMITIES: No motor dysfunction of the upper and lower extremity is noted.     Assessment/Plan   Problem List Items Addressed This Visit    None  Visit Diagnoses         Codes    Tympanosclerosis of both ears    -  Primary H74.03    Mixed conductive and sensorineural hearing loss of left ear with unrestricted hearing of right ear     H90.72    Referred otalgia of left ear     H92.02    Relevant Orders    CT sinus wo IV contrast volumetric surgical planning    TMJ pain dysfunction syndrome     M26.629    History of headache     Z87.898    Relevant Orders    CT sinus wo IV contrast volumetric surgical planning    Chronic cough     R05.3    Relevant Orders    CT sinus wo IV contrast volumetric surgical planning    Sinusitis, unspecified chronicity, unspecified location     J32.9    Relevant Orders    CT sinus wo IV contrast volumetric surgical planning             I discussed the clinical finds with patient.  Her exam today was negative again for signs of acute otitis media and there did not appear to be outward signs of purulent rhinitis.  She has a strong history of headaches migraine related.  She has been on antibiotics without improvement through her PCP primarily.  She told repeatedly that she has otitis media or sinusitis.  CT scanning of the region is to be done in order to evaluate this further.  If this is negative then she needs to follow-up with neurology in regards to her headaches and facial pain.  If there are positive findings and that we will  need to be evaluated further.    Sandro Ball DMD, MD 02/27/25 10:00 AM

## 2025-03-03 ENCOUNTER — APPOINTMENT (OUTPATIENT)
Dept: OTOLARYNGOLOGY | Facility: CLINIC | Age: 50
End: 2025-03-03
Payer: COMMERCIAL

## 2025-03-03 VITALS — BODY MASS INDEX: 24.98 KG/M2 | HEIGHT: 58 IN | WEIGHT: 119 LBS

## 2025-03-03 DIAGNOSIS — H90.72 MIXED CONDUCTIVE AND SENSORINEURAL HEARING LOSS OF LEFT EAR WITH UNRESTRICTED HEARING OF RIGHT EAR: ICD-10-CM

## 2025-03-03 DIAGNOSIS — Z78.9 USES BIRTH CONTROL: ICD-10-CM

## 2025-03-03 DIAGNOSIS — J32.9 SINUSITIS, UNSPECIFIED CHRONICITY, UNSPECIFIED LOCATION: ICD-10-CM

## 2025-03-03 DIAGNOSIS — R05.3 CHRONIC COUGH: ICD-10-CM

## 2025-03-03 DIAGNOSIS — Z87.898 HISTORY OF HEADACHE: ICD-10-CM

## 2025-03-03 DIAGNOSIS — M26.629 TMJ PAIN DYSFUNCTION SYNDROME: ICD-10-CM

## 2025-03-03 DIAGNOSIS — H74.03 TYMPANOSCLEROSIS OF BOTH EARS: Primary | ICD-10-CM

## 2025-03-03 DIAGNOSIS — H92.02 REFERRED OTALGIA OF LEFT EAR: ICD-10-CM

## 2025-03-03 PROCEDURE — 1036F TOBACCO NON-USER: CPT | Performed by: OTOLARYNGOLOGY

## 2025-03-03 PROCEDURE — 99214 OFFICE O/P EST MOD 30 MIN: CPT | Performed by: OTOLARYNGOLOGY

## 2025-03-03 PROCEDURE — 3008F BODY MASS INDEX DOCD: CPT | Performed by: OTOLARYNGOLOGY

## 2025-03-03 RX ORDER — NORETHINDRONE 0.35 MG/1
1 TABLET ORAL DAILY
Qty: 84 TABLET | Refills: 0 | Status: SHIPPED | OUTPATIENT
Start: 2025-03-03 | End: 2026-03-03

## 2025-03-05 DIAGNOSIS — H65.92 LEFT OTITIS MEDIA WITH EFFUSION: ICD-10-CM

## 2025-03-06 RX ORDER — FLUTICASONE PROPIONATE 50 MCG
2 SPRAY, SUSPENSION (ML) NASAL DAILY
Qty: 48 ML | Refills: 1 | Status: SHIPPED | OUTPATIENT
Start: 2025-03-06 | End: 2026-03-06

## 2025-03-11 ENCOUNTER — APPOINTMENT (OUTPATIENT)
Dept: NEUROLOGY | Facility: CLINIC | Age: 50
End: 2025-03-11
Payer: COMMERCIAL

## 2025-04-01 ENCOUNTER — APPOINTMENT (OUTPATIENT)
Facility: CLINIC | Age: 50
End: 2025-04-01
Payer: COMMERCIAL

## 2025-04-08 ENCOUNTER — APPOINTMENT (OUTPATIENT)
Facility: CLINIC | Age: 50
End: 2025-04-08
Payer: COMMERCIAL

## 2025-05-01 ENCOUNTER — APPOINTMENT (OUTPATIENT)
Dept: OBSTETRICS AND GYNECOLOGY | Facility: CLINIC | Age: 50
End: 2025-05-01
Payer: COMMERCIAL

## 2025-06-05 ENCOUNTER — APPOINTMENT (OUTPATIENT)
Dept: OBSTETRICS AND GYNECOLOGY | Facility: CLINIC | Age: 50
End: 2025-06-05
Payer: COMMERCIAL

## 2025-06-05 VITALS
HEIGHT: 58 IN | DIASTOLIC BLOOD PRESSURE: 90 MMHG | WEIGHT: 115.8 LBS | BODY MASS INDEX: 24.31 KG/M2 | SYSTOLIC BLOOD PRESSURE: 157 MMHG

## 2025-06-05 DIAGNOSIS — Z01.419 WELL WOMAN EXAM: Primary | ICD-10-CM

## 2025-06-05 DIAGNOSIS — Z78.9 USES BIRTH CONTROL: ICD-10-CM

## 2025-06-05 DIAGNOSIS — Z12.31 ENCOUNTER FOR SCREENING MAMMOGRAM FOR MALIGNANT NEOPLASM OF BREAST: ICD-10-CM

## 2025-06-05 PROCEDURE — 99396 PREV VISIT EST AGE 40-64: CPT | Performed by: ADVANCED PRACTICE MIDWIFE

## 2025-06-05 PROCEDURE — 1036F TOBACCO NON-USER: CPT | Performed by: ADVANCED PRACTICE MIDWIFE

## 2025-06-05 PROCEDURE — 3008F BODY MASS INDEX DOCD: CPT | Performed by: ADVANCED PRACTICE MIDWIFE

## 2025-06-05 RX ORDER — PHENOL 1.4 %
1 AEROSOL, SPRAY (ML) MUCOUS MEMBRANE DAILY
Qty: 90 TABLET | Refills: 3 | Status: SHIPPED | OUTPATIENT
Start: 2025-06-05 | End: 2026-06-05

## 2025-06-05 RX ORDER — NORETHINDRONE 0.35 MG/1
1 TABLET ORAL DAILY
Qty: 84 TABLET | Refills: 3 | Status: SHIPPED | OUTPATIENT
Start: 2025-06-05 | End: 2026-06-05

## 2025-06-05 ASSESSMENT — PATIENT HEALTH QUESTIONNAIRE - PHQ9
SUM OF ALL RESPONSES TO PHQ9 QUESTIONS 1 AND 2: 0
1. LITTLE INTEREST OR PLEASURE IN DOING THINGS: NOT AT ALL
2. FEELING DOWN, DEPRESSED OR HOPELESS: NOT AT ALL

## 2025-06-05 NOTE — PROGRESS NOTES
"Subjective   Indu Sinclair is a 49 y.o. female who is here for a routine well woman exam.     Concerns today:  none    No LMP recorded (lmp unknown).   Periods are regular every 28-30 days, lasting 5 days.   Dysmenorrhea:mild, occurring premenstrually and first 1-2 days of flow.   Cyclic symptoms include pelvic pain.     Sexual Activity: sexually active, male partners; Patient reports 1 partners in the last 12 months.  Pain with intercourse? No   Loss of desire? No   Able to have an orgasm? Yes     History of prior STI: none    Current contraception: oral progesterone-only contraceptive    Last pap:   History of abnormal Pap smear: no  Treatment for cervical dysplasia: no    Last mammogram:   History of abnormal mammogram: no  Family history of breast cancer or ovarian cancer: no    Menstrual History:  OB History          1    Para   1    Term   1            AB        Living   1         SAB        IAB        Ectopic        Multiple        Live Births   1                Menarche age: 12  LMP: 2024         Objective   /90 (BP Location: Right arm, Patient Position: Sitting, BP Cuff Size: Adult)   Ht 1.473 m (4' 10\")   Wt 52.5 kg (115 lb 12.8 oz)   LMP  (LMP Unknown)   BMI 24.20 kg/m²     Physical Exam  Constitutional:       Appearance: Normal appearance.   HENT:      Head: Normocephalic.      Nose: Nose normal.      Mouth/Throat:      Mouth: Mucous membranes are moist.      Pharynx: Oropharynx is clear.   Eyes:      Conjunctiva/sclera: Conjunctivae normal.      Pupils: Pupils are equal, round, and reactive to light.   Cardiovascular:      Rate and Rhythm: Normal rate and regular rhythm.   Pulmonary:      Effort: Pulmonary effort is normal.      Breath sounds: Normal breath sounds.   Chest:      Comments:     Abdominal:      General: Abdomen is flat.      Palpations: Abdomen is soft.   Genitourinary:     General: Normal vulva.      Vagina: Normal.      Cervix: Normal. "   Musculoskeletal:         General: Normal range of motion.      Cervical back: Normal range of motion and neck supple.   Skin:     General: Skin is warm and dry.          Neurological:      Mental Status: She is alert.   Psychiatric:         Mood and Affect: Mood normal.         Behavior: Behavior normal.        Assessment/Plan   Normal well woman exam  Continue healthy lifestyle habits  Consider taking a multivitamin daily  Continue recommended women's health screenings  Mammogram ordered, pt to schedule  Pap collected, if normal, repeat in 5 yrs  Birth control surveillance  Doing well on Micronor  No contraindications to use  Refill x 1 year     Return to care for annual exam or sooner as needed.    DENISSE Funes-MYLES

## 2025-06-10 ENCOUNTER — APPOINTMENT (OUTPATIENT)
Facility: CLINIC | Age: 50
End: 2025-06-10
Payer: COMMERCIAL

## 2025-06-10 VITALS
OXYGEN SATURATION: 98 % | DIASTOLIC BLOOD PRESSURE: 80 MMHG | WEIGHT: 114 LBS | RESPIRATION RATE: 16 BRPM | SYSTOLIC BLOOD PRESSURE: 128 MMHG | HEIGHT: 58 IN | HEART RATE: 77 BPM | TEMPERATURE: 98.2 F | BODY MASS INDEX: 23.93 KG/M2

## 2025-06-10 DIAGNOSIS — Z12.31 BREAST CANCER SCREENING BY MAMMOGRAM: ICD-10-CM

## 2025-06-10 DIAGNOSIS — Z12.4 CERVICAL CANCER SCREENING: ICD-10-CM

## 2025-06-10 DIAGNOSIS — R82.90 ABNORMAL URINALYSIS: ICD-10-CM

## 2025-06-10 DIAGNOSIS — Z12.11 SCREENING FOR MALIGNANT NEOPLASM OF COLON: ICD-10-CM

## 2025-06-10 DIAGNOSIS — Z00.00 HEALTHCARE MAINTENANCE: Primary | ICD-10-CM

## 2025-06-10 PROBLEM — H66.90 ACUTE RECURRENT OTITIS MEDIA: Status: RESOLVED | Noted: 2023-08-29 | Resolved: 2025-06-10

## 2025-06-10 PROBLEM — M54.12 CERVICAL NEURITIS: Status: RESOLVED | Noted: 2023-08-29 | Resolved: 2025-06-10

## 2025-06-10 LAB
POC APPEARANCE, URINE: CLEAR
POC BILIRUBIN, URINE: NEGATIVE
POC BLOOD, URINE: ABNORMAL
POC COLOR, URINE: YELLOW
POC GLUCOSE, URINE: NEGATIVE MG/DL
POC KETONES, URINE: NEGATIVE MG/DL
POC LEUKOCYTES, URINE: NEGATIVE
POC NITRITE,URINE: NEGATIVE
POC PH, URINE: 6 PH
POC PROTEIN, URINE: NEGATIVE MG/DL
POC SPECIFIC GRAVITY, URINE: 1.02
POC UROBILINOGEN, URINE: 0.2 EU/DL

## 2025-06-10 PROCEDURE — 81002 URINALYSIS NONAUTO W/O SCOPE: CPT | Performed by: FAMILY MEDICINE

## 2025-06-10 PROCEDURE — 1036F TOBACCO NON-USER: CPT | Performed by: FAMILY MEDICINE

## 2025-06-10 PROCEDURE — 3008F BODY MASS INDEX DOCD: CPT | Performed by: FAMILY MEDICINE

## 2025-06-10 PROCEDURE — 99396 PREV VISIT EST AGE 40-64: CPT | Performed by: FAMILY MEDICINE

## 2025-06-10 RX ORDER — TOPIRAMATE 25 MG/1
25 TABLET, FILM COATED ORAL 2 TIMES DAILY
COMMUNITY

## 2025-06-10 RX ORDER — TOPIRAMATE 100 MG/1
100 TABLET, FILM COATED ORAL 2 TIMES DAILY
COMMUNITY

## 2025-06-10 NOTE — PROGRESS NOTES
Subjective   Patient ID: Indu Sinclair is a 49 y.o. female who presents for Annual Exam.    Dentist and Eye Doctor appointments:  UTD with eye and due for dentist  Immunizations: UTD  Diet: eats healthy  Exercise: pilates and yoga  Supplements: mvi  Menstrual Cycles:progesterone only ocp  Sexually Active:yes, male, no std concerns  Pregnancy History:  Cancer Screening:    Cervical: 2024   Breast: due   Colon: due    Skin:sees derm   DEXA:n/a        She went to ENT and hasn't had further hearing loss.        Review of Systems    Current Outpatient Medications   Medication Instructions    aspirin-acetaminophen-caffeine (Excedrin Migraine) 250-250-65 mg tablet 1 tablet, Every 6 hours PRN    diphenhydrAMINE-acetaminophen (Tylenol PM Extra Strength)  mg per tablet Tylenol PM Extra Strength 500-25 MG Oral Tablet  Refills: 0  Start: 5-Oct-2022    Lactobac 42-Bifid 5-taqdyu-YMZ (Probiotic Plus Colostrum) -50 mg powder in packet Probiotic Oral Packet  Refills: 0  Start: 5-Oct-2022    multivitamin with minerals (Adults Multivitamin) tablet 1 tablet, oral, Daily    norethindrone (MICRONOR) 0.35 mg, oral, Daily    topiramate (TOPAMAX) 25 mg, oral, 2 times daily    topiramate (TOPAMAX) 100 mg, oral, 2 times daily       RX Allergies[1]  Patient has no known allergies.    Past Medical History:  2023: Abnormal tympanogram  No date: Cervicogenic headache  2023: Chronic GERD  2023: Chronic migraine without aura, with intractable migraine,   so stated, with status migrainosus  2023: Conductive hearing loss of left ear with unrestricted   hearing of right ear  No date: Ear pain  2023: Hypovitaminosis D  2023: Lateral epicondylitis of both elbows  No date: Macular degeneration  10/05/2022: Other conditions influencing health status      Comment:  Chronic migraine  No date: Other specified health status      Comment:  No pertinent past medical history  2023:  "Tympanosclerosis of left ear  08/29/2023: Uses birth control  08/29/2023: Vertigo    Social History     Tobacco Use    Smoking status: Never     Passive exposure: Never    Smokeless tobacco: Never   Substance Use Topics    Alcohol use: Yes     Comment: Occasionally       Objective     Vitals:    06/10/25 0933   BP: 128/80   Pulse: 77   Resp: 16   Temp: 36.8 °C (98.2 °F)   SpO2: 98%   Weight: 51.7 kg (114 lb)   Height: 1.473 m (4' 10\")     No LMP recorded (lmp unknown).    Physical Exam  Constitutional:       General: She is not in acute distress.     Appearance: She is well-developed. She is not diaphoretic.   HENT:      Head: Normocephalic and atraumatic.      Right Ear: Tympanic membrane normal.      Left Ear: Tympanic membrane normal.      Nose: Nose normal.      Mouth/Throat:      Mouth: Mucous membranes are moist.   Eyes:      General: No scleral icterus.     Pupils: Pupils are equal, round, and reactive to light.   Neck:      Thyroid: No thyromegaly.      Vascular: No JVD.   Cardiovascular:      Rate and Rhythm: Normal rate and regular rhythm.      Heart sounds: Normal heart sounds. No murmur heard.     No friction rub. No gallop.   Pulmonary:      Effort: Pulmonary effort is normal. No respiratory distress.      Breath sounds: Normal breath sounds. No wheezing or rales.   Chest:      Chest wall: No tenderness.   Abdominal:      General: Bowel sounds are normal. There is no distension.      Palpations: Abdomen is soft. There is no mass.      Tenderness: There is no abdominal tenderness. There is no rebound.   Musculoskeletal:         General: Normal range of motion.      Cervical back: Normal range of motion and neck supple.   Lymphadenopathy:      Cervical: No cervical adenopathy.   Skin:     General: Skin is warm and dry.   Neurological:      General: No focal deficit present.      Mental Status: She is alert and oriented to person, place, and time.      Deep Tendon Reflexes: Reflexes normal.   Psychiatric:  "        Mood and Affect: Mood normal.         Behavior: Behavior normal.         Assessment/Plan   Diagnoses and all orders for this visit:  Healthcare maintenance  -     POCT UA (nonautomated) manually resulted  Cervical cancer screening  Comments:  2024 - wnl negative hpv  Screening for malignant neoplasm of colon  Comments:  2021- Dr. Dukes - due in 2031  Breast cancer screening by mammogram  Comments:  due in 2025           [1] No Known Allergies

## 2025-06-11 DIAGNOSIS — R79.89 ELEVATED LFTS: Primary | ICD-10-CM

## 2025-06-11 LAB
25(OH)D3+25(OH)D2 SERPL-MCNC: 26 NG/ML (ref 30–100)
ALBUMIN SERPL-MCNC: 4.6 G/DL (ref 3.6–5.1)
ALP SERPL-CCNC: 61 U/L (ref 31–125)
ALT SERPL-CCNC: 35 U/L (ref 6–29)
ANION GAP SERPL CALCULATED.4IONS-SCNC: 8 MMOL/L (CALC) (ref 7–17)
AST SERPL-CCNC: 27 U/L (ref 10–35)
BILIRUB SERPL-MCNC: 0.7 MG/DL (ref 0.2–1.2)
BUN SERPL-MCNC: 12 MG/DL (ref 7–25)
CALCIUM SERPL-MCNC: 9 MG/DL (ref 8.6–10.2)
CHLORIDE SERPL-SCNC: 110 MMOL/L (ref 98–110)
CHOLEST SERPL-MCNC: 199 MG/DL
CHOLEST/HDLC SERPL: 3.3 (CALC)
CO2 SERPL-SCNC: 23 MMOL/L (ref 20–32)
CREAT SERPL-MCNC: 0.66 MG/DL (ref 0.5–0.99)
EGFRCR SERPLBLD CKD-EPI 2021: 107 ML/MIN/1.73M2
ERYTHROCYTE [DISTWIDTH] IN BLOOD BY AUTOMATED COUNT: 14.4 % (ref 11–15)
GLUCOSE SERPL-MCNC: 81 MG/DL (ref 65–99)
HCT VFR BLD AUTO: 41.4 % (ref 35–45)
HDLC SERPL-MCNC: 61 MG/DL
HGB BLD-MCNC: 14.5 G/DL (ref 11.7–15.5)
LDLC SERPL CALC-MCNC: 115 MG/DL (CALC)
MCH RBC QN AUTO: 32.3 PG (ref 27–33)
MCHC RBC AUTO-ENTMCNC: 35 G/DL (ref 32–36)
MCV RBC AUTO: 92.2 FL (ref 80–100)
NONHDLC SERPL-MCNC: 138 MG/DL (CALC)
PLATELET # BLD AUTO: 328 THOUSAND/UL (ref 140–400)
PMV BLD REES-ECKER: 9.7 FL (ref 7.5–12.5)
POTASSIUM SERPL-SCNC: 4.3 MMOL/L (ref 3.5–5.3)
PROT SERPL-MCNC: 7.1 G/DL (ref 6.1–8.1)
RBC # BLD AUTO: 4.49 MILLION/UL (ref 3.8–5.1)
SODIUM SERPL-SCNC: 141 MMOL/L (ref 135–146)
TRIGL SERPL-MCNC: 121 MG/DL
TSH SERPL-ACNC: 1.32 MIU/L
WBC # BLD AUTO: 7.6 THOUSAND/UL (ref 3.8–10.8)

## 2025-06-12 ENCOUNTER — TELEPHONE (OUTPATIENT)
Facility: CLINIC | Age: 50
End: 2025-06-12
Payer: COMMERCIAL

## 2025-06-12 DIAGNOSIS — R82.90 ABNORMAL URINALYSIS: Primary | ICD-10-CM

## 2025-06-12 LAB — BACTERIA UR CULT: NORMAL

## 2025-06-13 ENCOUNTER — TELEPHONE (OUTPATIENT)
Facility: CLINIC | Age: 50
End: 2025-06-13
Payer: COMMERCIAL

## 2025-06-13 NOTE — TELEPHONE ENCOUNTER
Patient saw her lab results and she would like to know what she should do next.  She read the comments.

## 2025-06-13 NOTE — TELEPHONE ENCOUNTER
Spoke with patient. Clarified PCP directions and also relayed urine culture results that pt had not viewed yet.

## 2025-07-01 ENCOUNTER — APPOINTMENT (OUTPATIENT)
Dept: NEUROLOGY | Facility: CLINIC | Age: 50
End: 2025-07-01
Payer: COMMERCIAL

## 2025-07-01 DIAGNOSIS — M25.521 ARTHRALGIA OF BOTH ELBOWS: ICD-10-CM

## 2025-07-01 DIAGNOSIS — G43.711 CHRONIC MIGRAINE WITHOUT AURA, INTRACTABLE, WITH STATUS MIGRAINOSUS: ICD-10-CM

## 2025-07-01 DIAGNOSIS — G44.40 MEDICATION OVERUSE HEADACHE: Primary | ICD-10-CM

## 2025-07-01 DIAGNOSIS — M25.522 ARTHRALGIA OF BOTH ELBOWS: ICD-10-CM

## 2025-07-01 DIAGNOSIS — M25.541 ARTHRALGIA OF BOTH HANDS: ICD-10-CM

## 2025-07-01 DIAGNOSIS — M25.542 ARTHRALGIA OF BOTH HANDS: ICD-10-CM

## 2025-07-01 PROCEDURE — 99214 OFFICE O/P EST MOD 30 MIN: CPT

## 2025-07-01 NOTE — PROGRESS NOTES
Virtual or Telephone Consent    An interactive audio and video telecommunication system which permits real time communications between the patient (at the originating site) and provider (at the distant site) was utilized to provide this telehealth service.     Verbal consent was requested and obtained from Indu Sinclair on this date, 7/1/2025 for a telehealth visit.     Subjective     HPI  Indu Sinclair is a 49 y.o. year old female who presents with chief complaint Medication overuse headache [G44.40] and chronic migraine.     Weaned off of topiramate in the spring, states that she has less numbness/tingling, however, states that she has also had worse pain, numbness and tingling in bilateral hands and elbows.   The pain is 8/10, aching. States she has been dealing with RA/ muscle pain for the last year, has not yet followed up with pain management. Using ibuprofen to treat, but still hurting. Inflammatory marker labs negative, no personal family history of rheumatoid/autoimmune disorders.     Indu states she is experiencing 30 headache days per month, and that the HAs are tolerable.  Triggers include barometric pressure  changes and stress. No aura. At the last follow up appointment, we discussed using Nurtec every other day as a preventive, states she has not tried it.   The headaches are usually sharp and tightness/ tension and are located in the frontal area near eyes. , generally symmetric.   The patient rates the most severe headaches a 8 in intensity. Currently using Tylenol and Advil does not rosalinda headache. Using Advil almost every day.  Generally, headaches last about 6 hours in duration.   Associated nausea, photophobia, and phonophobia. Vision changes-  not crisp.  Headaches are worsened with exertion.     Medications  Current & Past Treatments:  Preventive:  Topamax 100 at night   Topamax 25 as needed  Tizanidine in the past- was something she didn't like  No trigger, no nerve block,  no Botox  "yet    Rescue:  Nurtec for migraine rescue- \"hit or miss\"  Medrol dose Seferino somewhat effective  Ice   OTC meds  Advil   tylenol  Ubrelvy- not effective  Rizatriptan- nightmares  Sumatriptan- has tried    Current Medications[1]    Social History     Tobacco Use    Smoking status: Never     Passive exposure: Never    Smokeless tobacco: Never   Substance Use Topics    Alcohol use: Yes     Comment: Occasionally     Social History     Substance and Sexual Activity   Drug Use Never      ROS  As noted in HPI, otherwise all other systems have been reviewed are negative for complaint.     General Appearance: Indu is well-developed, well-nourished, 49 y.o. year old female, in no acute distress. Makes good eye contact, is alert, interactive, and cooperative. Demonstrates recent & remote memory recall. Subjective information consistent with objective assessment. *Assessment limited due to virtual visit.      Lab Results   Component Value Date    WBC 7.6 06/10/2025    RBC 4.49 06/10/2025    HGB 14.5 06/10/2025    HCT 41.4 06/10/2025     06/10/2025     06/10/2025    K 4.3 06/10/2025     06/10/2025    BUN 12 06/10/2025    CREATININE 0.66 06/10/2025    EGFR 107 06/10/2025    CALCIUM 9.0 06/10/2025    ALKPHOS 61 06/10/2025    AST 27 06/10/2025    ALT 35 (H) 06/10/2025    PJZMJXML14 390 09/03/2024    VITD25 26 (L) 06/10/2025    LDLCALC 115 (H) 06/10/2025    CHOL 199 06/10/2025    HDL 61 06/10/2025    TRIG 121 06/10/2025    TSH 1.32 06/10/2025        Neurological Exam  Cranial Nerves  CN III, IV, VI: Extraocular movements intact bilaterally. Normal lids and orbits bilaterally.  CN VII: Full and symmetric facial movement.  CN VIII: Hearing is normal.  Assessment & Plan  Medication overuse headache    Orders:    rimegepant (Nurtec ODT) 75 mg tablet,disintegrating; Dissolve 1 tablet (75 mg) in the mouth every other day.    Follow Up In Neurology; Future    Chronic migraine without aura, intractable, with status " migrainosus    Orders:    rimegepant (Nurtec ODT) 75 mg tablet,disintegrating; Dissolve 1 tablet (75 mg) in the mouth every other day.    Follow Up In Neurology; Future    Arthralgia of both hands         Arthralgia of both elbows           ASSESSMENT/PLAN  For arthritis/joint pain: Please follow up with pain management. PCP to re-check labs. If okay, discuss Celebrex for joint pain until you are seen by pain management.   Medication overuse HA from Tylenol/ibuprofen  Start Nurtec every other day for Migraine prevention- [facility] [phone]  Follow up in 3 months    I personally spent 30 minutes today, exclusive of procedures, providing care for this patient, including preparation, face to face time, documentation and other services such as review of medical records, diagnostic result, patient education, counseling, coordination of care as specified in the encounter.     Lor Ott, APRN-CNP       [1]   Current Outpatient Medications:     multivitamin with minerals (Adults Multivitamin) tablet, Take 1 tablet by mouth once daily., Disp: 90 tablet, Rfl: 3    norethindrone (Micronor) 0.35 mg tablet, Take 1 tablet (0.35 mg) by mouth once daily., Disp: 84 tablet, Rfl: 3    rimegepant (Nurtec ODT) 75 mg tablet,disintegrating, Dissolve 1 tablet (75 mg) in the mouth every other day., Disp: 16 tablet, Rfl: 10

## 2025-07-07 ENCOUNTER — TELEPHONE (OUTPATIENT)
Dept: NEUROLOGY | Facility: CLINIC | Age: 50
End: 2025-07-07
Payer: COMMERCIAL

## 2025-07-07 DIAGNOSIS — M25.522 ARTHRALGIA OF BOTH ELBOWS: ICD-10-CM

## 2025-07-07 DIAGNOSIS — M79.10 MYALGIA: ICD-10-CM

## 2025-07-07 DIAGNOSIS — M25.541 ARTHRALGIA OF BOTH HANDS: Primary | ICD-10-CM

## 2025-07-07 DIAGNOSIS — M25.542 ARTHRALGIA OF BOTH HANDS: Primary | ICD-10-CM

## 2025-07-07 DIAGNOSIS — M25.521 ARTHRALGIA OF BOTH ELBOWS: ICD-10-CM

## 2025-07-07 NOTE — PATIENT INSTRUCTIONS
Indu    Thank you for attending your virtual visit today with [facility]. It was a pleasure caring for you today. The best way to contact me for medication refills or questions about your care is through [employer]. Otherwise, you can contact the office by phone: [phone].    DENISSE Haynes-CNP    For arthritis/joint pain: Please follow up with pain management. PCP to re-check labs. If okay, discuss Celebrex for joint pain until you are seen by pain management.   Medication overuse HA from ibuprofen  Start Nurtec every other day for Migraine prevention- sent to  [facility] [phone]  Follow up in 3 months

## 2025-07-07 NOTE — PROGRESS NOTES
Patient called requesting Patient evaluated by orthopedics, rheumatology, and neurology for arthralgias/ myopathy. Workup negative for autoimmune disorders.   KANDICE, uric acid, RF, CRP, ESR, CK, IgG, vitamin B 12 WNL.   EMG results below.     EMG & nerve conduction 12/20/24  Status: Final result   Study Result  Narrative & Impression   IMPRESSION:  Impression:  EMG examination of the right upper extremity is normal.  There is no EMG evidence of a right median mononeuropathy across the wrist (carpal tunnel syndrome). In addition, there is no EMG evidence of right cervical radiculopathy or right ulnar mononeuropathy.  Karyn Cotton,      Chronic migraines/ headaches will continue to be managed by neurology. Suggest evaluation/ treatment plan by pain management for myalgias/arthropathies.     Lor Ott, DENISSE-CNP

## 2025-07-12 LAB
ALBUMIN SERPL-MCNC: 4.3 G/DL (ref 3.6–5.1)
ALBUMIN/GLOB SERPL: 1.8 (CALC) (ref 1–2.5)
ALP SERPL-CCNC: 71 U/L (ref 31–125)
ALT SERPL-CCNC: 41 U/L (ref 6–29)
APPEARANCE UR: CLEAR
AST SERPL-CCNC: 31 U/L (ref 10–35)
BACTERIA #/AREA URNS HPF: NORMAL /HPF
BACTERIA UR CULT: NORMAL
BILIRUB DIRECT SERPL-MCNC: 0.1 MG/DL
BILIRUB INDIRECT SERPL-MCNC: 0.7 MG/DL (CALC) (ref 0.2–1.2)
BILIRUB SERPL-MCNC: 0.8 MG/DL (ref 0.2–1.2)
BILIRUB UR QL STRIP: NEGATIVE
COLOR UR: YELLOW
GLOBULIN SER CALC-MCNC: 2.4 G/DL (CALC) (ref 1.9–3.7)
GLUCOSE UR QL STRIP: NEGATIVE
HGB UR QL STRIP: NEGATIVE
HYALINE CASTS #/AREA URNS LPF: NORMAL /LPF
KETONES UR QL STRIP: NEGATIVE
LEUKOCYTE ESTERASE UR QL STRIP: NEGATIVE
NITRITE UR QL STRIP: NEGATIVE
PH UR STRIP: 6.5 [PH] (ref 5–8)
PROT SERPL-MCNC: 6.7 G/DL (ref 6.1–8.1)
PROT UR QL STRIP: NEGATIVE
RBC #/AREA URNS HPF: NORMAL /HPF
SERVICE CMNT-IMP: NORMAL
SP GR UR STRIP: 1.02 (ref 1–1.03)
SQUAMOUS #/AREA URNS HPF: NORMAL /HPF
WBC #/AREA URNS HPF: NORMAL /HPF

## 2025-07-14 DIAGNOSIS — R79.89 ELEVATED LFTS: Primary | ICD-10-CM

## 2025-07-16 ENCOUNTER — HOSPITAL ENCOUNTER (OUTPATIENT)
Dept: RADIOLOGY | Facility: HOSPITAL | Age: 50
Discharge: HOME | End: 2025-07-16
Payer: COMMERCIAL

## 2025-07-16 DIAGNOSIS — R79.89 ELEVATED LFTS: ICD-10-CM

## 2025-07-16 PROCEDURE — 76705 ECHO EXAM OF ABDOMEN: CPT

## 2025-07-17 ENCOUNTER — APPOINTMENT (OUTPATIENT)
Dept: PAIN MEDICINE | Facility: CLINIC | Age: 50
End: 2025-07-17
Payer: COMMERCIAL

## 2025-07-31 ENCOUNTER — APPOINTMENT (OUTPATIENT)
Dept: PAIN MEDICINE | Facility: CLINIC | Age: 50
End: 2025-07-31
Payer: COMMERCIAL

## 2025-07-31 VITALS
OXYGEN SATURATION: 99 % | HEART RATE: 80 BPM | WEIGHT: 124 LBS | HEIGHT: 58 IN | RESPIRATION RATE: 18 BRPM | DIASTOLIC BLOOD PRESSURE: 90 MMHG | BODY MASS INDEX: 26.03 KG/M2 | TEMPERATURE: 98.6 F | SYSTOLIC BLOOD PRESSURE: 142 MMHG

## 2025-07-31 DIAGNOSIS — M25.50 POLYARTHRALGIA: ICD-10-CM

## 2025-07-31 DIAGNOSIS — G89.29 CHRONIC RADICULAR CERVICAL PAIN: Primary | ICD-10-CM

## 2025-07-31 DIAGNOSIS — M54.12 CHRONIC RADICULAR CERVICAL PAIN: Primary | ICD-10-CM

## 2025-07-31 PROCEDURE — 99214 OFFICE O/P EST MOD 30 MIN: CPT | Performed by: ANESTHESIOLOGY

## 2025-07-31 PROCEDURE — 1036F TOBACCO NON-USER: CPT | Performed by: ANESTHESIOLOGY

## 2025-07-31 PROCEDURE — 3008F BODY MASS INDEX DOCD: CPT | Performed by: ANESTHESIOLOGY

## 2025-07-31 PROCEDURE — 99204 OFFICE O/P NEW MOD 45 MIN: CPT | Performed by: ANESTHESIOLOGY

## 2025-07-31 RX ORDER — DULOXETIN HYDROCHLORIDE 30 MG/1
30 CAPSULE, DELAYED RELEASE ORAL DAILY
Qty: 30 CAPSULE | Refills: 1 | Status: SHIPPED | OUTPATIENT
Start: 2025-07-31 | End: 2025-09-29

## 2025-07-31 SDOH — ECONOMIC STABILITY: FOOD INSECURITY: WITHIN THE PAST 12 MONTHS, YOU WORRIED THAT YOUR FOOD WOULD RUN OUT BEFORE YOU GOT MONEY TO BUY MORE.: NEVER TRUE

## 2025-07-31 SDOH — ECONOMIC STABILITY: FOOD INSECURITY: WITHIN THE PAST 12 MONTHS, THE FOOD YOU BOUGHT JUST DIDN'T LAST AND YOU DIDN'T HAVE MONEY TO GET MORE.: NEVER TRUE

## 2025-07-31 ASSESSMENT — COLUMBIA-SUICIDE SEVERITY RATING SCALE - C-SSRS
1. IN THE PAST MONTH, HAVE YOU WISHED YOU WERE DEAD OR WISHED YOU COULD GO TO SLEEP AND NOT WAKE UP?: NO
2. HAVE YOU ACTUALLY HAD ANY THOUGHTS OF KILLING YOURSELF?: NO
6. HAVE YOU EVER DONE ANYTHING, STARTED TO DO ANYTHING, OR PREPARED TO DO ANYTHING TO END YOUR LIFE?: NO

## 2025-07-31 ASSESSMENT — ENCOUNTER SYMPTOMS
DEPRESSION: 0
SHORTNESS OF BREATH: 0
ADENOPATHY: 0
OCCASIONAL FEELINGS OF UNSTEADINESS: 0
LOSS OF SENSATION IN FEET: 0
NECK PAIN: 1
ARTHRALGIAS: 1
BLOOD IN STOOL: 0
DIFFICULTY URINATING: 0
NUMBNESS: 1
EYE PAIN: 0
FEVER: 0

## 2025-07-31 ASSESSMENT — LIFESTYLE VARIABLES
AUDIT-C TOTAL SCORE: 1
HOW MANY STANDARD DRINKS CONTAINING ALCOHOL DO YOU HAVE ON A TYPICAL DAY: 1 OR 2
HOW OFTEN DO YOU HAVE SIX OR MORE DRINKS ON ONE OCCASION: NEVER
SKIP TO QUESTIONS 9-10: 1
HOW OFTEN DO YOU HAVE A DRINK CONTAINING ALCOHOL: MONTHLY OR LESS
TOTAL SCORE: 0

## 2025-07-31 ASSESSMENT — PATIENT HEALTH QUESTIONNAIRE - PHQ9
1. LITTLE INTEREST OR PLEASURE IN DOING THINGS: NOT AT ALL
SUM OF ALL RESPONSES TO PHQ9 QUESTIONS 1 AND 2: 0
2. FEELING DOWN, DEPRESSED OR HOPELESS: NOT AT ALL

## 2025-07-31 ASSESSMENT — PAIN SCALES - GENERAL
PAINLEVEL_OUTOF10: 7
PAINLEVEL_OUTOF10: 7

## 2025-07-31 ASSESSMENT — PAIN - FUNCTIONAL ASSESSMENT: PAIN_FUNCTIONAL_ASSESSMENT: 0-10

## 2025-07-31 NOTE — PROGRESS NOTES
Chief Complain    New Patient Visit (For pain in b/l hands up to shoulders, that's been going on over a year. Thinks from [facility]. Deny neck and back surgery. No hip or knee pain. Have some images on file. Have not really managed the pain. Can't take Tylenol because Liver and Pancreas levels are elevated)    History Of Present Illness  Indu Sinclair is a 49 y.o. female here for evaluation of neck pain radiating to bilateral shoulder and upper extremities worse on the right side. The patient has been experiencing these symptoms for last 1 year(s). The patient describes the pain as burning, aching and soreness. The patient's current pain score is 7 on a scale from 0-10. The pain is worsened by sleeping, when not active and is alleviated by activities. Since the start of the symptoms the pain has been worse.    The patient denies any fever, chills,  bladder/ bowel incontinence, history of cancer, history of IV drug abuse, recent trauma.      Past Medical History  She has a past medical history of Abnormal tympanogram (08/29/2023), Cervicogenic headache, Chronic GERD (08/29/2023), Chronic migraine without aura, with intractable migraine, so stated, with status migrainosus (08/29/2023), Chronic pain disorder, Conductive hearing loss of left ear with unrestricted hearing of right ear (08/29/2023), Ear pain, Extremity pain, Headache, tension-type, Hypovitaminosis D (08/29/2023), Joint pain, Lateral epicondylitis of both elbows (11/20/2023), Low back pain, Macular degeneration, Neck pain, Numbness (A few months ago), Other conditions influencing health status (10/05/2022), Other specified health status, Tympanosclerosis of left ear (08/29/2023), Uses birth control (08/29/2023), and Vertigo (08/29/2023).    Surgical History  She has a past surgical history that includes Inner ear surgery (06/18/2018).    Social History  She reports that she has never smoked. She has never been exposed to tobacco smoke. She has never used  smokeless tobacco. She reports current alcohol use of about 1.0 standard drink of alcohol per week. She reports that she does not use drugs.    Family History  Family History[1]     Allergies  Patient has no known allergies.    Review of Systems  Review of Systems   Constitutional:  Negative for fever.   HENT:  Negative for ear pain.    Eyes:  Negative for pain.   Respiratory:  Negative for shortness of breath.    Cardiovascular:  Negative for chest pain.   Gastrointestinal:  Negative for blood in stool.   Endocrine: Negative for heat intolerance.   Genitourinary:  Negative for difficulty urinating.   Musculoskeletal:  Positive for arthralgias and neck pain.   Allergic/Immunologic: Negative for food allergies.   Neurological:  Positive for numbness.   Hematological:  Negative for adenopathy.   Psychiatric/Behavioral:  Negative for suicidal ideas.         Physical Exam  Physical Exam  Constitutional:       Appearance: Normal appearance.   HENT:      Head: Normocephalic and atraumatic.     Eyes:      Extraocular Movements: Extraocular movements intact.      Pupils: Pupils are equal, round, and reactive to light.       Cardiovascular:      Rate and Rhythm: Normal rate.   Pulmonary:      Effort: Pulmonary effort is normal.   Abdominal:      Palpations: Abdomen is soft.     Musculoskeletal:      Cervical back: Neck supple.     Skin:     General: Skin is warm.     Neurological:      General: No focal deficit present.      Mental Status: She is alert and oriented to person, place, and time.      Motor: Motor function is intact.      Coordination: Coordination is intact.      Gait: Gait is intact.      Deep Tendon Reflexes:      Reflex Scores:       Tricep reflexes are 2+ on the right side and 2+ on the left side.       Bicep reflexes are 2+ on the right side and 2+ on the left side.       Brachioradialis reflexes are 2+ on the right side and 2+ on the left side.       Patellar reflexes are 2+ on the right side and 2+ on  "the left side.       Achilles reflexes are 2+ on the right side and 2+ on the left side.    Psychiatric:         Mood and Affect: Mood normal.         Behavior: Behavior normal.           Last Recorded Vitals  Blood pressure 142/90, pulse 80, temperature 37 °C (98.6 °F), resp. rate 18, height (!) 1.473 m (4' 10\"), weight 56.2 kg (124 lb), SpO2 99%.    Reviewed Images  Reviewed and independently interpreted Xray cervical spine done in 2024, reveals mild degenerative changes no major issues alignment or acute injuries    Reviewed Labs   Latest Reference Range & Units 06/10/25 10:17   GLUCOSE 65 - 99 mg/dL 81   SODIUM 135 - 146 mmol/L 141   POTASSIUM 3.5 - 5.3 mmol/L 4.3   CHLORIDE 98 - 110 mmol/L 110   CARBON DIOXIDE 20 - 32 mmol/L 23   ELECTROLYTE BALANCE 7 - 17 mmol/L (calc) 8   UREA NITROGEN (BUN) 7 - 25 mg/dL 12   CREATININE 0.50 - 0.99 mg/dL 0.66   EGFR > OR = 60 mL/min/1.73m2 107   CALCIUM 8.6 - 10.2 mg/dL 9.0   ALBUMIN 3.6 - 5.1 g/dL 4.6      Latest Reference Range & Units 06/10/25 10:17   WHITE BLOOD CELL COUNT 3.8 - 10.8 Thousand/uL 7.6   RED BLOOD CELL COUNT 3.80 - 5.10 Million/uL 4.49   HEMOGLOBIN 11.7 - 15.5 g/dL 14.5   HEMATOCRIT 35.0 - 45.0 % 41.4   MCV 80.0 - 100.0 fL 92.2   MCH 27.0 - 33.0 pg 32.3   MCHC 32.0 - 36.0 g/dL 35.0   RDW 11.0 - 15.0 % 14.4   PLATELET COUNT 140 - 400 Thousand/uL 328   MPV 7.5 - 12.5 fL 9.7        Assessment/Plan   Encounter Diagnosis   Name Primary?    Chronic radicular cervical pain Yes        Indu Sinclair is a 49 y.o. female here for evaluation of chronic neck pain radiating to shoulder and right upper extremity worse on the right side.  Also been experiencing widespread joint pain including hand, wrist, knees.  She has been experiencing the symptoms for over a year or so far.  Has been evaluated by neurology, her PCP and rheumatology.  Had blood work done which did not reveal any rheumatological issues.  Currently she is most bothered by her cervical radicular symptoms. "  I would recommend trial of physical therapy focusing on that, would also trial her on Cymbalta.  If she does not respond would get an MRI for further evaluation.           Soy Akins MD       [1]   Family History  Problem Relation Name Age of Onset    Hypertension Mother Lisa Emmy     Lung cancer Mother Lisa Emmy     Brain cancer Mother Lisa Emmy     Hypertension Father Rod Emmy     Heart attack Father Rod Emmy     Stroke Father Rod Emmy     Hyperlipidemia Father Rod Emmy     No Known Problems Sister      Hyperlipidemia Sister      No Known Problems Brother      No Known Problems Brother      Other (Suicide) Brother      No Known Problems Brother      No Known Problems Brother      No Known Problems Brother      No Known Problems Brother      Depression Son Tye Sinclair     Cancer Father Mother 50 - 59

## 2025-08-14 ENCOUNTER — TELEPHONE (OUTPATIENT)
Facility: CLINIC | Age: 50
End: 2025-08-14
Payer: COMMERCIAL

## 2025-08-22 ENCOUNTER — APPOINTMENT (OUTPATIENT)
Dept: RADIOLOGY | Facility: HOSPITAL | Age: 50
End: 2025-08-22
Payer: COMMERCIAL

## 2025-08-27 ENCOUNTER — APPOINTMENT (OUTPATIENT)
Facility: CLINIC | Age: 50
End: 2025-08-27
Payer: COMMERCIAL

## 2025-08-27 DIAGNOSIS — M54.12 CHRONIC RADICULAR CERVICAL PAIN: ICD-10-CM

## 2025-08-27 DIAGNOSIS — G89.29 CHRONIC RADICULAR CERVICAL PAIN: ICD-10-CM

## 2025-08-27 RX ORDER — DULOXETIN HYDROCHLORIDE 30 MG/1
30 CAPSULE, DELAYED RELEASE ORAL DAILY
Qty: 90 CAPSULE | Refills: 1 | Status: SHIPPED | OUTPATIENT
Start: 2025-08-27 | End: 2025-10-26

## 2025-09-12 ENCOUNTER — APPOINTMENT (OUTPATIENT)
Dept: RADIOLOGY | Facility: HOSPITAL | Age: 50
End: 2025-09-12
Payer: COMMERCIAL

## 2025-09-18 ENCOUNTER — APPOINTMENT (OUTPATIENT)
Dept: RADIOLOGY | Facility: HOSPITAL | Age: 50
End: 2025-09-18
Payer: COMMERCIAL

## 2026-01-27 ENCOUNTER — APPOINTMENT (OUTPATIENT)
Dept: AUDIOLOGY | Facility: CLINIC | Age: 51
End: 2026-01-27
Payer: COMMERCIAL

## 2026-01-27 ENCOUNTER — APPOINTMENT (OUTPATIENT)
Dept: OTOLARYNGOLOGY | Facility: CLINIC | Age: 51
End: 2026-01-27
Payer: COMMERCIAL